# Patient Record
Sex: FEMALE | Race: WHITE | NOT HISPANIC OR LATINO | Employment: FULL TIME | URBAN - METROPOLITAN AREA
[De-identification: names, ages, dates, MRNs, and addresses within clinical notes are randomized per-mention and may not be internally consistent; named-entity substitution may affect disease eponyms.]

---

## 2018-10-29 PROBLEM — Z12.4 CERVICAL CANCER SCREENING: Status: ACTIVE | Noted: 2018-10-29

## 2018-10-31 ENCOUNTER — RESULT FOLLOW UP (OUTPATIENT)
Dept: FAMILY MEDICINE | Facility: CLINIC | Age: 33
End: 2018-10-31

## 2018-10-31 ENCOUNTER — OFFICE VISIT (OUTPATIENT)
Dept: FAMILY MEDICINE | Facility: CLINIC | Age: 33
End: 2018-10-31
Payer: COMMERCIAL

## 2018-10-31 VITALS
HEIGHT: 67 IN | SYSTOLIC BLOOD PRESSURE: 118 MMHG | WEIGHT: 186.5 LBS | OXYGEN SATURATION: 98 % | BODY MASS INDEX: 29.27 KG/M2 | HEART RATE: 75 BPM | RESPIRATION RATE: 14 BRPM | TEMPERATURE: 98 F | DIASTOLIC BLOOD PRESSURE: 72 MMHG

## 2018-10-31 DIAGNOSIS — E03.9 ACQUIRED HYPOTHYROIDISM: ICD-10-CM

## 2018-10-31 DIAGNOSIS — Z13.1 SCREENING FOR DIABETES MELLITUS: ICD-10-CM

## 2018-10-31 DIAGNOSIS — E78.2 MIXED HYPERLIPIDEMIA: ICD-10-CM

## 2018-10-31 DIAGNOSIS — Z12.4 CERVICAL CANCER SCREENING: ICD-10-CM

## 2018-10-31 DIAGNOSIS — Z00.00 ENCOUNTER FOR ROUTINE ADULT HEALTH EXAMINATION WITHOUT ABNORMAL FINDINGS: Primary | ICD-10-CM

## 2018-10-31 DIAGNOSIS — Z13.29 SCREENING FOR THYROID DISORDER: ICD-10-CM

## 2018-10-31 DIAGNOSIS — R87.810 CERVICAL HIGH RISK HPV (HUMAN PAPILLOMAVIRUS) TEST POSITIVE: ICD-10-CM

## 2018-10-31 LAB — HBA1C MFR BLD: 5.6 % (ref 0–5.6)

## 2018-10-31 PROCEDURE — 87624 HPV HI-RISK TYP POOLED RSLT: CPT | Performed by: FAMILY MEDICINE

## 2018-10-31 PROCEDURE — G0145 SCR C/V CYTO,THINLAYER,RESCR: HCPCS | Performed by: FAMILY MEDICINE

## 2018-10-31 PROCEDURE — 99385 PREV VISIT NEW AGE 18-39: CPT | Performed by: FAMILY MEDICINE

## 2018-10-31 PROCEDURE — 36415 COLL VENOUS BLD VENIPUNCTURE: CPT | Performed by: FAMILY MEDICINE

## 2018-10-31 PROCEDURE — 80061 LIPID PANEL: CPT | Performed by: FAMILY MEDICINE

## 2018-10-31 PROCEDURE — 83036 HEMOGLOBIN GLYCOSYLATED A1C: CPT | Performed by: FAMILY MEDICINE

## 2018-10-31 PROCEDURE — 84443 ASSAY THYROID STIM HORMONE: CPT | Performed by: FAMILY MEDICINE

## 2018-10-31 PROCEDURE — 84439 ASSAY OF FREE THYROXINE: CPT | Performed by: FAMILY MEDICINE

## 2018-10-31 RX ORDER — ATORVASTATIN CALCIUM 20 MG/1
20 TABLET, FILM COATED ORAL DAILY
Refills: 3 | COMMUNITY
Start: 2018-10-15 | End: 2020-03-05

## 2018-10-31 RX ORDER — LEVOTHYROXINE SODIUM 50 UG/1
50 TABLET ORAL DAILY
Refills: 1 | COMMUNITY
Start: 2018-10-15 | End: 2019-02-22

## 2018-10-31 RX ORDER — NORETHINDRONE ACETATE AND ETHINYL ESTRADIOL .02; 1 MG/1; MG/1
1 TABLET ORAL DAILY
COMMUNITY
End: 2019-10-14

## 2018-10-31 NOTE — PROGRESS NOTES
SUBJECTIVE:   CC: Princess Bonner is an 33 year old woman who presents for preventive health visit.     Physical   Annual:     Getting at least 3 servings of Calcium per day:  Yes    Bi-annual eye exam:  NO    Dental care twice a year:  Yes    Sleep apnea or symptoms of sleep apnea:  None    Diet:  Regular (no restrictions)    Frequency of exercise:  4-5 days/week    Duration of exercise:  45-60 minutes    Taking medications regularly:  Yes    Medication side effects:  None    Additional concerns today:  No      Originally from [place], [place].    Moved to [place] in 2012.  Moved back to [place] recently and needs to establish care and get meds refilled/managed here, along with labs that are due.     Went to her PCP in [place] last May 2018.    Would like to get help with weight loss.     Today's PHQ-2 Score:   PHQ-2 ( 1999 [employer]) 10/31/2018   Q1: Little interest or pleasure in doing things 0   Q2: Feeling down, depressed or hopeless 0   PHQ-2 Score 0   Q1: Little interest or pleasure in doing things Not at all   Q2: Feeling down, depressed or hopeless Not at all   PHQ-2 Score 0       Abuse: Current or Past(Physical, Sexual or Emotional)- No  Do you feel safe in your environment - Yes    Social History   Substance Use Topics     Smoking status: Never Smoker     Smokeless tobacco: Never Used     Alcohol use Yes      Comment: Occ     Alcohol Use 10/31/2018   If you drink alcohol do you typically have greater than 3 drinks per day OR greater than 7 drinks per week? No       Reviewed orders with patient.  Reviewed health maintenance and updated orders accordingly - Yes  Labs reviewed in EPIC        Pertinent mammograms are reviewed under the imaging tab.  History of abnormal Pap smear: NO - age 30-65 PAP every 5 years with negative HPV co-testing recommended     Reviewed and updated as needed this visit by clinical staff  Tobacco  Allergies  Meds  Problems  Med Hx  Surg Hx  Fam Hx  Soc Hx          Reviewed and updated as  "needed this visit by Provider  Allergies  Meds  Problems          History reviewed. No pertinent past medical history.   Past Surgical History:   Procedure Laterality Date     wisdom teeth removed      first surgery was 2010, had bottom wosdom teeth taken out in August 2018     Obstetric History     No data available          Review of Systems  CONSTITUTIONAL: NEGATIVE for fever, chills, change in weight  INTEGUMENTARU/SKIN: NEGATIVE for worrisome rashes, moles or lesions  EYES: NEGATIVE for vision changes or irritation  ENT: NEGATIVE for ear, mouth and throat problems  RESP: NEGATIVE for significant cough or SOB  BREAST: NEGATIVE for masses, tenderness or discharge  CV: NEGATIVE for chest pain, palpitations or peripheral edema  GI: NEGATIVE for nausea, abdominal pain, heartburn, or change in bowel habits  : NEGATIVE for unusual urinary or vaginal symptoms. Periods are regular.  MUSCULOSKELETAL: NEGATIVE for significant arthralgias or myalgia  NEURO: NEGATIVE for weakness, dizziness or paresthesias  HEME/ALLERGY/IMMUNE: NEGATIVE for bleeding problems  PSYCHIATRIC: NEGATIVE for changes in mood or affect     OBJECTIVE:   /72 (Cuff Size: Adult Regular)  Pulse 75  Temp 98  F (36.7  C) (Tympanic)  Resp 14  Ht 5' 7\" (1.702 m)  Wt 186 lb 8 oz (84.6 kg)  LMP 10/23/2018  SpO2 98%  Breastfeeding? No  BMI 29.21 kg/m2  Physical Exam  GENERAL: healthy, alert and no distress  EYES: Eyes grossly normal to inspection, PERRL and conjunctivae and sclerae normal  HENT: ear canals and TM's normal, nose and mouth without ulcers or lesions  NECK: no adenopathy, no asymmetry, masses, or scars and thyroid normal to palpation  RESP: lungs clear to auscultation - no rales, rhonchi or wheezes  BREAST: normal without masses, tenderness or nipple discharge and no palpable axillary masses or adenopathy  CV: regular rate and rhythm, normal S1 S2, no S3 or S4, no murmur, click or rub, no peripheral edema and peripheral pulses " strong  ABDOMEN: soft, nontender, no hepatosplenomegaly, no masses and bowel sounds normal   (female): normal female external genitalia, normal urethral meatus, vaginal mucosa pink, moist, well rugated, and normal cervix/adnexa/uterus without masses or discharge  MS: no gross musculoskeletal defects noted, no edema  SKIN: no suspicious lesions or rashes  NEURO: Normal strength and tone, mentation intact and speech normal  PSYCH: mentation appears normal, affect normal/bright    Diagnostic Test Results:  TSH/T4, lipids, HgbA1c  ASSESSMENT/PLAN:   Princess was seen today for physical.    Diagnoses and all orders for this visit:    Encounter for routine adult health examination without abnormal findings    Screening for thyroid disorder  -     Cancel: TSH with free T4 reflex    Cervical cancer screening  -     Pap imaged thin layer screen with HPV - recommended age 30 - 65 years (select HPV order below)  -     HPV High Risk Types DNA Cervical    Acquired hypothyroidism  -     TSH  -     T4, free  -     BARIATRIC ADULT REFERRAL    Mixed hyperlipidemia  -     Lipid Profile (Chol, Trig, HDL, LDL calc)  -     BARIATRIC ADULT REFERRAL    Screening for diabetes mellitus  -     Hemoglobin A1c; Future  -     Hemoglobin A1c    Other orders  -     Cancel: TDAP, IM (10 - 64 YRS) - Adacel      Had tetanus shot in 2013.   Had a normal pap smear 2 years ago.  Will consider getting HgbA1c but will check with insurance first.  Agreeable to try out meal program referral to help with weight loss issues.   Follow-up recommended for yearly preventive exams or sooner for an acute issues.      COUNSELING:  Reviewed preventive health counseling, as reflected in patient instructions  Special attention given to:        Regular exercise       Healthy diet/nutrition       Vision screening       Hearing screening       HIV screeninx in teen years, 1x in adult years, and at intervals if high risk    BP Readings from Last 1 Encounters:  "  10/31/18 118/72     Estimated body mass index is 29.21 kg/(m^2) as calculated from the following:    Height as of this encounter: 5' 7\" (1.702 m).    Weight as of this encounter: 186 lb 8 oz (84.6 kg).      Weight management plan: Discussed healthy diet and exercise guidelines and patient will follow up in 12 months in clinic to re-evaluate.     reports that she has never smoked. She has never used smokeless tobacco.      Counseling Resources:  ATP IV Guidelines  Pooled Cohorts Equation Calculator  Breast Cancer Risk Calculator  FRAX Risk Assessment  ICSI Preventive Guidelines  Dietary Guidelines for Americans, 2010  USDA's MyPlate  ASA Prophylaxis  Lung CA Screening    Eladia Yi,   [facility]  Answers for HPI/ROS submitted by the patient on 10/31/2018   PHQ-2 Score: 0    "

## 2018-10-31 NOTE — MR AVS SNAPSHOT
After Visit Summary   10/31/2018    Princess Bonner    MRN: [id number]           Patient Information     Date Of Birth          1985        Visit Information        Provider Department      10/31/2018 9:50 AM Eladia Yi DO [facility]        Today's Diagnoses     Encounter for routine adult health examination without abnormal findings    -  1    Screening for thyroid disorder        Cervical cancer screening        Acquired hypothyroidism        Mixed hyperlipidemia          Care Instructions      Preventive Health Recommendations  Female Ages 26 - 39  Yearly exam:   See your health care provider every year in order to    Review health changes.     Discuss preventive care.      Review your medicines if you your doctor has prescribed any.    Until age 30: Get a Pap test every three years (more often if you have had an abnormal result).    After age 30: Talk to your doctor about whether you should have a Pap test every 3 years or have a Pap test with HPV screening every 5 years.   You do not need a Pap test if your uterus was removed (hysterectomy) and you have not had cancer.  You should be tested each year for STDs (sexually transmitted diseases), if you're at risk.   Talk to your provider about how often to have your cholesterol checked.  If you are at risk for diabetes, you should have a diabetes test (fasting glucose).  Shots: Get a flu shot each year. Get a tetanus shot every 10 years.   Nutrition:     Eat at least 5 servings of fruits and vegetables each day.    Eat whole-grain bread, whole-wheat pasta and brown rice instead of white grains and rice.    Get adequate Calcium and Vitamin D.     Lifestyle    Exercise at least 150 minutes a week (30 minutes a day, 5 days of the week). This will help you control your weight and prevent disease.    Limit alcohol to one drink per day.    No smoking.     Wear sunscreen to prevent skin cancer.    See your dentist  "every six months for an exam and cleaning.            Follow-ups after your visit        Follow-up notes from your care team     Return in about 1 year (around 10/31/2019) for Physical Exam.      Who to contact     If you have questions or need follow up information about today's clinic visit or your schedule please contact [facility] directly at [phone].  Normal or non-critical lab and imaging results will be communicated to you by neoSaejhart, letter or phone within 4 business days after the clinic has received the results. If you do not hear from us within 7 days, please contact the clinic through neoSaejhart or phone. If you have a critical or abnormal lab result, we will notify you by phone as soon as possible.  Submit refill requests through [facility] or call your pharmacy and they will forward the refill request to us. Please allow 3 business days for your refill to be completed.          Additional Information About Your Visit        neoSaejharvideoNEXT Information     [facility] lets you send messages to your doctor, view your test results, renew your prescriptions, schedule appointments and more. To sign up, go to [email or web address] . Click on \"Log in\" on the left side of the screen, which will take you to the Welcome page. Then click on \"Sign up Now\" on the right side of the page.     You will be asked to enter the access code listed below, as well as some personal information. Please follow the directions to create your username and password.     Your access code is: [email or web address]  Expires: 2019  9:57 AM     Your access code will  in 90 days. If you need help or a new code, please call your [facility] or [phone].        Care EveryWhere ID     This is your Care EveryWhere ID. This could be used by other organizations to access your [facility] medical records  [id number]        Your Vitals Were     Pulse Temperature Respirations Height Last Period Pulse Oximetry    75 98 " " F (36.7  C) (Tympanic) 14 5' 7\" (1.702 m) 10/23/2018 98%    Breastfeeding? BMI (Body Mass Index)                No 29.21 kg/m2           Blood Pressure from Last 3 Encounters:   10/31/18 118/72    Weight from Last 3 Encounters:   10/31/18 186 lb 8 oz (84.6 kg)              We Performed the Following     HPV High Risk Types DNA Cervical     Lipid Profile (Chol, Trig, HDL, LDL calc)     Pap imaged thin layer screen with HPV - recommended age 30 - 65 years (select HPV order below)     TSH with free T4 reflex        Primary Care Provider Office Phone # Fax #    [facility] [phone] [address] [facility] [address]        Equal Access to Services     SHIVAM BERMEO : Hadii harleen reeder hadasho Soomaali, waaxda luqadaha, qaybta kaalmada adeegyada, santiago thomas . So [facility] [phone].    ATENCIÓN: Si habla español, tiene a perry disposición servicios gratuitos de asistencia lingüística. Llame al [phone].    We comply with applicable federal civil rights laws and [place] laws. We do not discriminate on the basis of race, color, national origin, age, disability, sex, sexual orientation, or gender identity.            Thank you!     Thank you for choosing [facility]  for your care. Our goal is always to provide you with excellent care. Hearing back from our patients is one way we can continue to improve our services. Please take a few minutes to complete the written survey that you may receive in the mail after your visit with us. Thank you!             Your Updated Medication List - Protect others around you: Learn how to safely use, store and throw away your medicines at www.disposemymeds.org.          This list is accurate as of 10/31/18 10:40 AM.  Always use your most recent med list.                   Brand Name Dispense Instructions for use Diagnosis    atorvastatin 20 MG tablet    LIPITOR     Take 20 mg " by mouth daily        levothyroxine 50 MCG tablet    SYNTHROID/LEVOTHROID     Take 50 mcg by mouth daily        norethindrone-ethinyl estradiol 1-20 MG-MCG per tablet    MICROGESTIN 1/20     Take 1 tablet by mouth daily

## 2018-11-01 LAB
CHOLEST SERPL-MCNC: 199 MG/DL
HDLC SERPL-MCNC: 47 MG/DL
LDLC SERPL CALC-MCNC: 91 MG/DL
NONHDLC SERPL-MCNC: 152 MG/DL
T4 FREE SERPL-MCNC: 0.96 NG/DL (ref 0.76–1.46)
TRIGL SERPL-MCNC: 303 MG/DL
TSH SERPL DL<=0.005 MIU/L-ACNC: 1.49 MU/L (ref 0.4–4)

## 2018-11-02 LAB
COPATH REPORT: NORMAL
PAP: NORMAL

## 2018-11-05 LAB
FINAL DIAGNOSIS: ABNORMAL
HPV HR 12 DNA CVX QL NAA+PROBE: NEGATIVE
HPV16 DNA SPEC QL NAA+PROBE: POSITIVE
HPV18 DNA SPEC QL NAA+PROBE: NEGATIVE
SPECIMEN DESCRIPTION: ABNORMAL
SPECIMEN SOURCE CVX/VAG CYTO: ABNORMAL

## 2018-11-05 NOTE — PROGRESS NOTES
10/31/18: NIL pap, + HR HPV type 16. Plan [facility] due bef 01/31/19.  11/06/18:Msg left to call back. Pt was advised.  11/13/18 [facility] Bx--BELA 1. Plan: pap in 1 year. Pt was advised. ([facility])  10/24/19 NIL pap, Neg HPV. Plan: cotest in 3 years ([facility])

## 2018-11-13 ENCOUNTER — OFFICE VISIT (OUTPATIENT)
Dept: OBGYN | Facility: CLINIC | Age: 33
End: 2018-11-13
Payer: COMMERCIAL

## 2018-11-13 DIAGNOSIS — Z01.812 PRE-PROCEDURE LAB EXAM: ICD-10-CM

## 2018-11-13 DIAGNOSIS — R87.810 CERVICAL HIGH RISK HPV (HUMAN PAPILLOMAVIRUS) TEST POSITIVE: Primary | ICD-10-CM

## 2018-11-13 DIAGNOSIS — N89.8 ITCHING OF VAGINA: ICD-10-CM

## 2018-11-13 LAB — BETA HCG QUAL IFA URINE: NEGATIVE

## 2018-11-13 PROCEDURE — 88305 TISSUE EXAM BY PATHOLOGIST: CPT | Performed by: OBSTETRICS & GYNECOLOGY

## 2018-11-13 PROCEDURE — 84703 CHORIONIC GONADOTROPIN ASSAY: CPT | Performed by: OBSTETRICS & GYNECOLOGY

## 2018-11-13 PROCEDURE — 57454 BX/CURETT OF CERVIX W/SCOPE: CPT | Performed by: OBSTETRICS & GYNECOLOGY

## 2018-11-13 RX ORDER — FLUCONAZOLE 150 MG/1
150 TABLET ORAL
Qty: 2 TABLET | Refills: 0 | Status: SHIPPED | OUTPATIENT
Start: 2018-11-13 | End: 2018-11-17

## 2018-11-13 NOTE — MR AVS SNAPSHOT
After Visit Summary   11/13/2018    Princess Bonner    MRN: [id number]           Patient Information     Date Of Birth          1985        Visit Information        Provider Department      11/13/2018 9:00 AM Lucía eGorge MD; [facility]POSCOPE 1 [facility]        Today's Diagnoses     Cervical high risk HPV (human papillomavirus) test positive    -  1    Pre-procedure lab exam        Itching of vagina           Follow-ups after your visit        Who to contact     If you have questions or need follow up information about today's clinic visit or your schedule please contact [facility] directly at [phone].  Normal or non-critical lab and imaging results will be communicated to you by MyChart, letter or phone within 4 business days after the clinic has received the results. If you do not hear from us within 7 days, please contact the clinic through Yesmywinehart or phone. If you have a critical or abnormal lab result, we will notify you by phone as soon as possible.  Submit refill requests through [employer] or call your pharmacy and they will forward the refill request to us. Please allow 3 business days for your refill to be completed.          Additional Information About Your Visit        MyChart Information     [employer] gives you secure access to your electronic health record. If you see a primary care provider, you can also send messages to your care team and make appointments. If you have questions, please call your primary care clinic.  If you do not have a primary care provider, please call [phone] and they will assist you.        Care EveryWhere ID     This is your Care EveryWhere ID. This could be used by other organizations to access your [place] medical records  [id number]        Your Vitals Were     Last Period                   10/23/2018            Blood Pressure from Last 3 Encounters:   10/31/18 118/72    Weight from Last 3  Encounters:   10/31/18 186 lb 8 oz (84.6 kg)              We Performed the Following     Beta HCG Qual, Urine - [facility] and [place] ([id number])     COLP CERVIX/UPPER VAGINA W ENDOCERV CURETT     Surgical pathology exam          Today's Medication Changes          These changes are accurate as of 11/13/18  9:41 AM.  If you have any questions, ask your nurse or doctor.               Start taking these medicines.        Dose/Directions    fluconazole 150 MG tablet   Commonly known as:  DIFLUCAN   Used for:  Itching of vagina   Started by:  Lucía George MD        Dose:  150 mg   Take 1 tablet (150 mg) by mouth every 72 hours for 2 doses   Quantity:  2 tablet   Refills:  0            Where to get your medicines      These medications were sent to [facility]/pharmacy #[id number] - [place], [place] - [address]  [address] [address]     Phone:  [phone]     fluconazole 150 MG tablet                Primary Care Provider Office Phone # Fax #    [facility] [phone] [address] [facility] [address]        Equal Access to Services     SHIVAM BERMEO : Hadii harleen ku hadasho Soomaali, waaxda luqadaha, qaybta kaalmada adejluisyada, santiago thomas . So [facility] [phone].    ATENCIÓN: Si habla español, tiene a perry disposición servicios gratuitos de asistencia lingüística. Llame al [phone].    We comply with applicable federal civil rights laws and [place] laws. We do not discriminate on the basis of race, color, national origin, age, disability, sex, sexual orientation, or gender identity.            Thank you!     Thank you for choosing [facility]  for your care. Our goal is always to provide you with excellent care. Hearing back from our patients is one way we can continue to improve our services. Please take a few minutes to complete the written survey that you may receive in the mail after your visit  with us. Thank you!             Your Updated Medication List - Protect others around you: Learn how to safely use, store and throw away your medicines at www.disposemymeds.org.          This list is accurate as of 11/13/18  9:41 AM.  Always use your most recent med list.                   Brand Name Dispense Instructions for use Diagnosis    atorvastatin 20 MG tablet    LIPITOR     Take 20 mg by mouth daily        fluconazole 150 MG tablet    DIFLUCAN    2 tablet    Take 1 tablet (150 mg) by mouth every 72 hours for 2 doses    Itching of vagina       levothyroxine 50 MCG tablet    SYNTHROID/LEVOTHROID     Take 50 mcg by mouth daily        MULTIVITAMIN PO           norethindrone-ethinyl estradiol 1-20 MG-MCG per tablet    MICROGESTIN 1/20     Take 1 tablet by mouth daily

## 2018-11-13 NOTE — PROGRESS NOTES
INDICATIONS:                                                        Princess Bonner, is a 33 year old female, who had a recent NIL pap, positive HPV-16.  Patient has prior history of abnormal pap with cryo therapy in 2008, and normal paps since then.     Here today for colposcopy. Discussed indication, risks of infection and bleeding.    Her last pap was   Lab Results   Component Value Date    PAP NIL, +HPV-16 10/31/2018     Is a pregnancy test required: Yes.  Was it positive or negative?  Negative  Was a consent obtained?  Yes      PROCEDURE:                                                      Cervix is stained with acetic acid and viewed colposcopically. Squamocolumnar junction is not visualized in it's entirety. However a frond like acetowhite lesion is seen at the 12 o'clock position.. acetowhite lesion(s) noted at 12 o'clock . Biopsy done Yes. Endocervical curretage Done         POST PROCEDURE:                                                      IMPRESSION: Patient tolerated procedure well, colposcopy adequate, HPV and BELA 1    PLAN : Await the results of the biopsies.  Repeat pap in 12 months.  She  tolerated the procedure well. There were no complications. Patient was discharged in stable condition.    Patient advised to call the clinic if excessive bleeding, pelvic pain, or fever.     Follow-up plan based on pathology results.    Lucía George MD

## 2018-11-15 LAB — COPATH REPORT: NORMAL

## 2018-11-16 ENCOUNTER — MYC MEDICAL ADVICE (OUTPATIENT)
Dept: FAMILY MEDICINE | Facility: CLINIC | Age: 33
End: 2018-11-16

## 2019-01-17 DIAGNOSIS — Z52.001 STEM CELL DONOR: Primary | ICD-10-CM

## 2019-01-22 DIAGNOSIS — Z52.001 STEM CELL DONOR: Primary | ICD-10-CM

## 2019-01-29 ENCOUNTER — OFFICE VISIT (OUTPATIENT)
Dept: TRANSPLANT | Facility: CLINIC | Age: 34
End: 2019-01-29
Attending: PHYSICIAN ASSISTANT
Payer: COMMERCIAL

## 2019-01-29 ENCOUNTER — APPOINTMENT (OUTPATIENT)
Dept: LAB | Facility: CLINIC | Age: 34
End: 2019-01-29
Attending: INTERNAL MEDICINE
Payer: COMMERCIAL

## 2019-01-29 VITALS
RESPIRATION RATE: 16 BRPM | TEMPERATURE: 97.9 F | SYSTOLIC BLOOD PRESSURE: 126 MMHG | OXYGEN SATURATION: 100 % | DIASTOLIC BLOOD PRESSURE: 79 MMHG | HEART RATE: 87 BPM

## 2019-01-29 DIAGNOSIS — Z52.001 STEM CELL DONOR: ICD-10-CM

## 2019-01-29 LAB
APTT PPP: 27 SEC (ref 22–37)
BASOPHILS # BLD AUTO: 0 10E9/L (ref 0–0.2)
BASOPHILS NFR BLD AUTO: 0.4 %
DIFFERENTIAL METHOD BLD: NORMAL
EOSINOPHIL # BLD AUTO: 0.3 10E9/L (ref 0–0.7)
EOSINOPHIL NFR BLD AUTO: 3.4 %
ERYTHROCYTE [DISTWIDTH] IN BLOOD BY AUTOMATED COUNT: 12.7 % (ref 10–15)
HCG UR QL: NEGATIVE
HCT VFR BLD AUTO: 43.1 % (ref 35–47)
HGB BLD-MCNC: 14.5 G/DL (ref 11.7–15.7)
IMM GRANULOCYTES # BLD: 0 10E9/L (ref 0–0.4)
IMM GRANULOCYTES NFR BLD: 0.3 %
INR PPP: 0.91 (ref 0.86–1.14)
LYMPHOCYTES # BLD AUTO: 1.7 10E9/L (ref 0.8–5.3)
LYMPHOCYTES NFR BLD AUTO: 18.2 %
MCH RBC QN AUTO: 30.7 PG (ref 26.5–33)
MCHC RBC AUTO-ENTMCNC: 33.6 G/DL (ref 31.5–36.5)
MCV RBC AUTO: 91 FL (ref 78–100)
MONOCYTES # BLD AUTO: 0.7 10E9/L (ref 0–1.3)
MONOCYTES NFR BLD AUTO: 7.4 %
NEUTROPHILS # BLD AUTO: 6.5 10E9/L (ref 1.6–8.3)
NEUTROPHILS NFR BLD AUTO: 70.3 %
NRBC # BLD AUTO: 0 10*3/UL
NRBC BLD AUTO-RTO: 0 /100
PLATELET # BLD AUTO: 223 10E9/L (ref 150–450)
RBC # BLD AUTO: 4.73 10E12/L (ref 3.8–5.2)
WBC # BLD AUTO: 9.3 10E9/L (ref 4–11)

## 2019-01-29 PROCEDURE — G0463 HOSPITAL OUTPT CLINIC VISIT: HCPCS | Mod: ZF

## 2019-01-29 PROCEDURE — 85610 PROTHROMBIN TIME: CPT | Performed by: INTERNAL MEDICINE

## 2019-01-29 PROCEDURE — 36415 COLL VENOUS BLD VENIPUNCTURE: CPT

## 2019-01-29 PROCEDURE — 85025 COMPLETE CBC W/AUTO DIFF WBC: CPT | Performed by: INTERNAL MEDICINE

## 2019-01-29 PROCEDURE — 85730 THROMBOPLASTIN TIME PARTIAL: CPT | Performed by: INTERNAL MEDICINE

## 2019-01-29 PROCEDURE — 81025 URINE PREGNANCY TEST: CPT | Performed by: PHYSICIAN ASSISTANT

## 2019-01-29 ASSESSMENT — PAIN SCALES - GENERAL: PAINLEVEL: NO PAIN (0)

## 2019-01-29 NOTE — NURSING NOTE
"Oncology Rooming Note    January 29, 2019 9:22 AM   Princess Bonner is a 33 year old female who presents for:    Chief Complaint   Patient presents with     RECHECK     RTN- URD H&P     Initial Vitals: /79 (BP Location: Right arm, Patient Position: Sitting, Cuff Size: Adult Regular)   Pulse 87   Temp 97.9  F (36.6  C) (Oral)   Resp 16   SpO2 100%  Estimated body mass index is 29.21 kg/m  as calculated from the following:    Height as of 10/31/18: 1.702 m (5' 7\").    Weight as of 10/31/18: 84.6 kg (186 lb 8 oz). There is no height or weight on file to calculate BSA.  No Pain (0) Comment: Data Unavailable   No LMP recorded.  Allergies reviewed: Yes  Medications reviewed: Yes    Medications: Medication refills not needed today.  Pharmacy name entered into [employer]    [facility] [facility] [address] AT [facility] & [address] [place] [address]/PHARMACY #[id number] - [facility] [address] [facility]    Clinical concerns: none     7 minutes for nursing intake (face to face time)     Anais Barajas CMA              "

## 2019-01-29 NOTE — NURSING NOTE
Chief Complaint   Patient presents with     RECHECK     RTN- URD H&P     Blood Draw     Labs drawn via VPT by RN in lab. VS taken. Pt checked in for next appt     Labs collected from venipuncture by RN. Vitals taken. Checked in for appointment(s). No urine order. Pt stated for HCG. Test re-ordered, notified provider.    Keira DUARTE RN PHN BSN  BMT/Oncology Lab

## 2019-01-29 NOTE — PROGRESS NOTES
BMT Donor History and Physical    Princess Bonner MRN# [id number]   Age: 33 year old YOB: 1985            Assessment and Plan   Princess is a 33 year old women who is donating Peripheral blood stem cell through the [employer] Program: [employer].  She is currently healthy.  Recently had her thyroid labs checked again and her current levothyroxine dose is correct. She says no fevers.  No recent antibiotics. She has a infrequent cough after having a cold about 2 weeks ago.  No fevers. She says she is having this second history and physical because she will need a central line for PBSCT collection.  Her CBC, coags and pregnancy test today are within normal and her pregnancy test is egative.  She is no longer taking oral birth control.     HPI:  Worked for [employer] and was part of be the match.  She lives in [place], [place]. drive they had.  Transfusions: No  Hepatitis: No  Currently pregnant or any chance may be pregnant: No  Currently breast feeding: No  Currently using a method of birth control: No  Number of previous pregnancies: no  Alcohol use: Not currently drinking.  Occasional glass of wine  Tobacco use: No  Recreational drugs: No  Nonmedical percutaneous drug use: No  Recent immunizations or planning on getting any immunizations: Yes, flu shot in October  Ear or body piercing in the last 12 months: No  New tattoo in recent 12 months:No  History of cancer or blood disease: No  Known risk factors: No travel outside of the [place]         Past Medical History:   Hypothyroid, diagnosed in April 2018  Hyperlipidemia, familial, takes atorvastatin       Past Surgical History:    [place] teeth removed.         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  Lives in [place].         Family History:   Aunt with pancreatic cancer.  Cousin who had liver cancer.  Mom and dad are well and alive.  Dad's mom and dad both had strokes.  Mother's mom had a stroke as  well.Uncle has diabetes.  One sister and one brother.  Brother has depression.  Sister is healthy       Immunizations:   Recent flu shot.         Allergies:   No known drug allergies         Medications:     Current Outpatient Medications   Medication Sig     atorvastatin (LIPITOR) 20 MG tablet Take 20 mg by mouth daily     levothyroxine (SYNTHROID/LEVOTHROID) 50 MCG tablet Take 50 mcg by mouth daily     Multiple Vitamins-Minerals (MULTIVITAMIN PO)      norethindrone-ethinyl estradiol (MICROGESTIN 1/20) 1-20 MG-MCG per tablet Take 1 tablet by mouth daily     No current facility-administered medications for this visit.             Review of Systems:   CONSTITUTIONAL:  negative for  fevers, chills, sweats, fatigue and weight loss  EYES:  Negative.  No blank spots or floaters  HEENT:  Negative. No current sneezing or congestion.  No problems swallowing..  RESPIRATORY:  Occasional, nonproductive cough, no sob  CARDIOVASCULAR:  Negative, no chest pain or palpitations  negative for  chest pain, dyspnea, palpitations  GASTROINTESTINAL:  negative for nausea, vomiting, diarrhea, abdominal pain, hematemesis and hemtochezia.  No black or tarry stool.  MUSCULOSKELETAL:  negative for  myalgias, arthralgias, joint swelling and muscle weakness  MOOD: no history of depression  : monthly menstrual cycle  Skin: no current rash       Physical Exam:   Blood pressure 126/79, pulse 87, temperature 97.9  F (36.6  C), temperature source Oral, resp. rate 16, SpO2 100 %, not currently breastfeeding.      Constitutional:   awake, alert, cooperative, no apparent distress, and appears stated age     Eyes:   Lids and lashes normal, pupils equal, round and reactive to light, extra ocular muscles intact, sclera clear, conjunctiva normal     ENT:   Normocephalic, without obvious abnormality, atraumatic, sinuses nontender on palpation, external ears without lesions, oral pharynx with moist mucous membranes, tonsils without erythema or exudates,  gums normal and good dentition.     Lungs:   No increased work of breathing, good air exchange, clear to auscultation bilaterally, no crackles or wheezing     Cardiovascular:   Normal apical impulse, regular rate and rhythm, normal S1 and S2, no S3 or S4, and no murmur noted     Abdomen:   No scars, normal bowel sounds, soft, non-distended, non-tender, no masses palpated, no hepatosplenomegally     Skin:   no bruising or bleeding, no rashes and no lesions            Data:   All laboratory data reviewed         María Reese PA-C

## 2019-02-15 ENCOUNTER — MYC MEDICAL ADVICE (OUTPATIENT)
Dept: FAMILY MEDICINE | Facility: CLINIC | Age: 34
End: 2019-02-15

## 2019-02-15 DIAGNOSIS — E03.9 ACQUIRED HYPOTHYROIDISM: Primary | ICD-10-CM

## 2019-02-22 RX ORDER — LEVOTHYROXINE SODIUM 50 UG/1
50 TABLET ORAL DAILY
Qty: 90 TABLET | Refills: 1 | Status: SHIPPED | OUTPATIENT
Start: 2019-02-22 | End: 2019-09-12

## 2019-02-22 NOTE — TELEPHONE ENCOUNTER
"Last OV 10/31/2018.  Requested Prescriptions   Pending Prescriptions Disp Refills     levothyroxine (SYNTHROID/LEVOTHROID) 50 MCG tablet  1     Sig: Take 1 tablet (50 mcg) by mouth daily    Thyroid Protocol Passed - 2/22/2019  8:53 AM       Passed - Patient is 12 years or older       Passed - Recent (12 mo) or future (30 days) visit within the authorizing provider's specialty    Patient had office visit in the last 12 months or has a visit in the next 30 days with authorizing provider or within the authorizing provider's specialty.  See \"Patient Info\" tab in inbasket, or \"Choose Columns\" in Meds & Orders section of the refill encounter.             Passed - Medication is active on med list       Passed - Normal TSH on file in past 12 months    Recent Labs   Lab Test 10/31/18  1045   TSH 1.49             Passed - No active pregnancy on record    If patient is pregnant or has had a positive pregnancy test, please check TSH.         Passed - No positive pregnancy test in past 12 months    If patient is pregnant or has had a positive pregnancy test, please check TSH.          Routing refill request to provider for review/approval because:  Medication is reported/historical        "

## 2019-10-14 ENCOUNTER — OFFICE VISIT (OUTPATIENT)
Dept: FAMILY MEDICINE | Facility: CLINIC | Age: 34
End: 2019-10-14
Payer: COMMERCIAL

## 2019-10-14 VITALS
OXYGEN SATURATION: 96 % | SYSTOLIC BLOOD PRESSURE: 102 MMHG | WEIGHT: 187 LBS | DIASTOLIC BLOOD PRESSURE: 64 MMHG | HEIGHT: 67 IN | BODY MASS INDEX: 29.35 KG/M2 | TEMPERATURE: 96.9 F | HEART RATE: 78 BPM | RESPIRATION RATE: 16 BRPM

## 2019-10-14 DIAGNOSIS — R53.83 OTHER FATIGUE: ICD-10-CM

## 2019-10-14 DIAGNOSIS — R87.810 CERVICAL HIGH RISK HPV (HUMAN PAPILLOMAVIRUS) TEST POSITIVE: ICD-10-CM

## 2019-10-14 DIAGNOSIS — Z00.00 ENCOUNTER FOR ROUTINE ADULT HEALTH EXAMINATION WITHOUT ABNORMAL FINDINGS: Primary | ICD-10-CM

## 2019-10-14 DIAGNOSIS — E78.2 MIXED HYPERLIPIDEMIA: ICD-10-CM

## 2019-10-14 DIAGNOSIS — E03.9 ACQUIRED HYPOTHYROIDISM: ICD-10-CM

## 2019-10-14 LAB
ALBUMIN SERPL-MCNC: 4.1 G/DL (ref 3.4–5)
ALP SERPL-CCNC: 54 U/L (ref 40–150)
ALT SERPL W P-5'-P-CCNC: 28 U/L (ref 0–50)
ANION GAP SERPL CALCULATED.3IONS-SCNC: 10 MMOL/L (ref 3–14)
AST SERPL W P-5'-P-CCNC: 19 U/L (ref 0–45)
BASOPHILS # BLD AUTO: 0 10E9/L (ref 0–0.2)
BASOPHILS NFR BLD AUTO: 0.5 %
BILIRUB SERPL-MCNC: 1.3 MG/DL (ref 0.2–1.3)
BUN SERPL-MCNC: 11 MG/DL (ref 7–30)
CALCIUM SERPL-MCNC: 8.7 MG/DL (ref 8.5–10.1)
CHLORIDE SERPL-SCNC: 107 MMOL/L (ref 94–109)
CHOLEST SERPL-MCNC: 186 MG/DL
CO2 SERPL-SCNC: 20 MMOL/L (ref 20–32)
CREAT SERPL-MCNC: 0.88 MG/DL (ref 0.52–1.04)
DIFFERENTIAL METHOD BLD: NORMAL
EOSINOPHIL # BLD AUTO: 0.2 10E9/L (ref 0–0.7)
EOSINOPHIL NFR BLD AUTO: 2.9 %
ERYTHROCYTE [DISTWIDTH] IN BLOOD BY AUTOMATED COUNT: 13 % (ref 10–15)
GFR SERPL CREATININE-BSD FRML MDRD: 85 ML/MIN/{1.73_M2}
GLUCOSE SERPL-MCNC: 105 MG/DL (ref 70–99)
HBA1C MFR BLD: 5.4 % (ref 0–5.6)
HCT VFR BLD AUTO: 41.3 % (ref 35–47)
HDLC SERPL-MCNC: 55 MG/DL
HGB BLD-MCNC: 13.9 G/DL (ref 11.7–15.7)
LDLC SERPL CALC-MCNC: 74 MG/DL
LYMPHOCYTES # BLD AUTO: 2.6 10E9/L (ref 0.8–5.3)
LYMPHOCYTES NFR BLD AUTO: 36.3 %
MCH RBC QN AUTO: 30.3 PG (ref 26.5–33)
MCHC RBC AUTO-ENTMCNC: 33.7 G/DL (ref 31.5–36.5)
MCV RBC AUTO: 90 FL (ref 78–100)
MONOCYTES # BLD AUTO: 0.7 10E9/L (ref 0–1.3)
MONOCYTES NFR BLD AUTO: 8.9 %
NEUTROPHILS # BLD AUTO: 3.7 10E9/L (ref 1.6–8.3)
NEUTROPHILS NFR BLD AUTO: 51.4 %
NONHDLC SERPL-MCNC: 131 MG/DL
PLATELET # BLD AUTO: 238 10E9/L (ref 150–450)
POTASSIUM SERPL-SCNC: 3.4 MMOL/L (ref 3.4–5.3)
PROT SERPL-MCNC: 7.3 G/DL (ref 6.8–8.8)
RBC # BLD AUTO: 4.58 10E12/L (ref 3.8–5.2)
SODIUM SERPL-SCNC: 137 MMOL/L (ref 133–144)
T4 FREE SERPL-MCNC: 0.84 NG/DL (ref 0.76–1.46)
TRIGL SERPL-MCNC: 283 MG/DL
TSH SERPL DL<=0.005 MIU/L-ACNC: 4.66 MU/L (ref 0.4–4)
WBC # BLD AUTO: 7.3 10E9/L (ref 4–11)

## 2019-10-14 PROCEDURE — 99213 OFFICE O/P EST LOW 20 MIN: CPT | Mod: 25 | Performed by: FAMILY MEDICINE

## 2019-10-14 PROCEDURE — 83036 HEMOGLOBIN GLYCOSYLATED A1C: CPT | Performed by: FAMILY MEDICINE

## 2019-10-14 PROCEDURE — 80050 GENERAL HEALTH PANEL: CPT | Performed by: FAMILY MEDICINE

## 2019-10-14 PROCEDURE — 36415 COLL VENOUS BLD VENIPUNCTURE: CPT | Performed by: FAMILY MEDICINE

## 2019-10-14 PROCEDURE — 99395 PREV VISIT EST AGE 18-39: CPT | Performed by: FAMILY MEDICINE

## 2019-10-14 PROCEDURE — 80061 LIPID PANEL: CPT | Performed by: FAMILY MEDICINE

## 2019-10-14 PROCEDURE — 84439 ASSAY OF FREE THYROXINE: CPT | Performed by: FAMILY MEDICINE

## 2019-10-14 ASSESSMENT — MIFFLIN-ST. JEOR: SCORE: 1567.92

## 2019-10-14 NOTE — PROGRESS NOTES
SUBJECTIVE:   CC: Princess Bonner is an 34 year old woman who presents for preventive health visit.     Healthy Habits:     Getting at least 3 servings of Calcium per day:  Yes    Bi-annual eye exam:  NO    Dental care twice a year:  Yes    Sleep apnea or symptoms of sleep apnea:  None    Diet:  Regular (no restrictions)    Frequency of exercise:  4-5 days/week    Duration of exercise:  45-60 minutes    Taking medications regularly:  Yes    Medication side effects:  None    PHQ-2 Total Score: 0    Additional concerns today:  No    Patient has appt at [facility] Dr next week for her PAP  Feeling more tired than usual.  Might be due to thyroid?  Periods have been regular.  No longer taking OCP's.        Today's PHQ-2 Score:   PHQ-2 ( 1999 [employer]) 10/14/2019   Q1: Little interest or pleasure in doing things 0   Q2: Feeling down, depressed or hopeless 0   PHQ-2 Score 0   Q1: Little interest or pleasure in doing things Not at all   Q2: Feeling down, depressed or hopeless Not at all   PHQ-2 Score 0       Abuse: Current or Past(Physical, Sexual or Emotional)- No  Do you feel safe in your environment? Yes    Social History     Tobacco Use     Smoking status: Never Smoker     Smokeless tobacco: Never Used   Substance Use Topics     Alcohol use: Yes     Comment: Occ         Alcohol Use 10/14/2019   Prescreen: >3 drinks/day or >7 drinks/week? No       Reviewed orders with patient.  Reviewed health maintenance and updated orders accordingly - Yes  Lab work is in process  Labs reviewed in [facility]    Mammogram not appropriate for this patient based on age.    Pertinent mammograms are reviewed under the imaging tab.  History of abnormal Pap smear: YES - updated in Problem List and Health Maintenance accordingly  PAP / HPV Latest Ref Rng & Units 10/31/2018   PAP - NIL   HPV 16 DNA NEG:Negative Positive(A)   HPV 18 DNA NEG:Negative Negative   OTHER HR HPV NEG:Negative Negative     Reviewed and updated as needed this visit by clinical  "staff  Tobacco  Allergies  Meds  Med Hx  Surg Hx  Fam Hx  Soc Hx        Reviewed and updated as needed this visit by Provider          Past Medical History:   Diagnosis Date     Cervical high risk HPV (human papillomavirus) test positive 10/31/2018    10/31/18:See problem list.       Past Surgical History:   Procedure Laterality Date     CRYOTHERAPY, CERVICAL  2008     wisdom teeth removed      first surgery was , had bottom wosdom teeth taken out in 2018     OB History    Para Term  AB Living   0 0 0 0 0 0   SAB TAB Ectopic Multiple Live Births   0 0 0 0 0       Review of Systems  CONSTITUTIONAL: NEGATIVE for fever, chills, change in weight  INTEGUMENTARU/SKIN: NEGATIVE for worrisome rashes, moles or lesions  EYES: NEGATIVE for vision changes or irritation  ENT: NEGATIVE for ear, mouth and throat problems  RESP: NEGATIVE for significant cough or SOB  BREAST: NEGATIVE for masses, tenderness or discharge  CV: NEGATIVE for chest pain, palpitations or peripheral edema  GI: NEGATIVE for nausea, abdominal pain, heartburn, or change in bowel habits  : NEGATIVE for unusual urinary or vaginal symptoms. Periods are regular.  MUSCULOSKELETAL: NEGATIVE for significant arthralgias or myalgia  NEURO: NEGATIVE for weakness, dizziness or paresthesias  HEME/ALLERGY/IMMUNE: NEGATIVE for bleeding problems  PSYCHIATRIC: NEGATIVE for changes in mood or affect     OBJECTIVE:   /64   Pulse 78   Temp 96.9  F (36.1  C) (Tympanic)   Resp 16   Ht 1.689 m (5' 6.5\")   Wt 84.8 kg (187 lb)   LMP 2019 (Exact Date)   SpO2 96%   Breastfeeding? No   BMI 29.73 kg/m    Physical Exam  GENERAL: healthy, alert and no distress  EYES: Eyes grossly normal to inspection, PERRL and conjunctivae and sclerae normal  HENT: ear canals and TM's normal, nose and mouth without ulcers or lesions  NECK: no adenopathy, no asymmetry, masses, or scars and thyroid normal to palpation  RESP: lungs clear to auscultation " "- no rales, rhonchi or wheezes  BREAST: declined exam  CV: regular rate and rhythm, normal S1 S2, no S3 or S4, no murmur, click or rub, no peripheral edema and peripheral pulses strong  ABDOMEN: soft, nontender, no hepatosplenomegaly, no masses and bowel sounds normal   (female): declined exam  MS: no gross musculoskeletal defects noted, no edema  SKIN: no suspicious lesions or rashes  NEURO: Normal strength and tone, mentation intact and speech normal  PSYCH: mentation appears normal, affect normal/bright    Diagnostic Test Results:  Labs reviewed in Epic    ASSESSMENT/PLAN:   Princess was seen today for physical.    Diagnoses and all orders for this visit:    Encounter for routine adult health examination without abnormal findings    Acquired hypothyroidism  -     TSH  -     T4 free    Cervical high risk HPV (human papillomavirus) test positive    Mixed hyperlipidemia  -     Comprehensive metabolic panel  -     Hemoglobin A1c  -     Lipid Profile    Other fatigue  -     Comprehensive metabolic panel  -     Hemoglobin A1c  -     CBC with platelets and differential      Fatigue--labs  Recheck thyroid, adjust Levothyroxine dose if needed  Continue statin  Will get pap smear done with GYN    COUNSELING:  Reviewed preventive health counseling, as reflected in patient instructions  Special attention given to:        Regular exercise       Healthy diet/nutrition       Vision screening       Hearing screening       Immunizations    Up to date  Estimated body mass index is 29.73 kg/m  as calculated from the following:    Height as of this encounter: 1.689 m (5' 6.5\").    Weight as of this encounter: 84.8 kg (187 lb).    Weight management plan: Discussed healthy diet and exercise guidelines     reports that she has never smoked. She has never used smokeless tobacco.      Counseling Resources:  ATP IV Guidelines  Pooled Cohorts Equation Calculator  Breast Cancer Risk Calculator  FRAX Risk Assessment  ICSI Preventive " Guidelines  Dietary Guidelines for Americans, 2010  USDA's MyPlate  ASA Prophylaxis  Lung CA Screening    Eladia Yi,   [facility]

## 2019-10-15 ENCOUNTER — MYC MEDICAL ADVICE (OUTPATIENT)
Dept: FAMILY MEDICINE | Facility: CLINIC | Age: 34
End: 2019-10-15

## 2019-10-16 RX ORDER — LEVOTHYROXINE SODIUM 75 UG/1
75 TABLET ORAL DAILY
Qty: 30 TABLET | Refills: 2 | Status: SHIPPED | OUTPATIENT
Start: 2019-10-16 | End: 2019-12-10

## 2019-10-24 ENCOUNTER — OFFICE VISIT (OUTPATIENT)
Dept: OBGYN | Facility: CLINIC | Age: 34
End: 2019-10-24
Payer: COMMERCIAL

## 2019-10-24 VITALS
HEIGHT: 67 IN | BODY MASS INDEX: 29.51 KG/M2 | WEIGHT: 188 LBS | SYSTOLIC BLOOD PRESSURE: 114 MMHG | HEART RATE: 80 BPM | DIASTOLIC BLOOD PRESSURE: 62 MMHG

## 2019-10-24 DIAGNOSIS — N87.0 CERVICAL DYSPLASIA, MILD: ICD-10-CM

## 2019-10-24 DIAGNOSIS — Z12.4 SCREENING FOR CERVICAL CANCER: ICD-10-CM

## 2019-10-24 DIAGNOSIS — Z01.419 ENCOUNTER FOR GYNECOLOGICAL EXAMINATION WITHOUT ABNORMAL FINDING: Primary | ICD-10-CM

## 2019-10-24 PROCEDURE — 88175 CYTOPATH C/V AUTO FLUID REDO: CPT | Performed by: OBSTETRICS & GYNECOLOGY

## 2019-10-24 PROCEDURE — 99395 PREV VISIT EST AGE 18-39: CPT | Performed by: OBSTETRICS & GYNECOLOGY

## 2019-10-24 PROCEDURE — 87624 HPV HI-RISK TYP POOLED RSLT: CPT | Performed by: OBSTETRICS & GYNECOLOGY

## 2019-10-24 ASSESSMENT — ANXIETY QUESTIONNAIRES
2. NOT BEING ABLE TO STOP OR CONTROL WORRYING: NOT AT ALL
5. BEING SO RESTLESS THAT IT IS HARD TO SIT STILL: NOT AT ALL
7. FEELING AFRAID AS IF SOMETHING AWFUL MIGHT HAPPEN: NOT AT ALL
1. FEELING NERVOUS, ANXIOUS, OR ON EDGE: NOT AT ALL
GAD7 TOTAL SCORE: 0
IF YOU CHECKED OFF ANY PROBLEMS ON THIS QUESTIONNAIRE, HOW DIFFICULT HAVE THESE PROBLEMS MADE IT FOR YOU TO DO YOUR WORK, TAKE CARE OF THINGS AT HOME, OR GET ALONG WITH OTHER PEOPLE: NOT DIFFICULT AT ALL
3. WORRYING TOO MUCH ABOUT DIFFERENT THINGS: NOT AT ALL
6. BECOMING EASILY ANNOYED OR IRRITABLE: NOT AT ALL

## 2019-10-24 ASSESSMENT — PATIENT HEALTH QUESTIONNAIRE - PHQ9
5. POOR APPETITE OR OVEREATING: NOT AT ALL
SUM OF ALL RESPONSES TO PHQ QUESTIONS 1-9: 2

## 2019-10-24 ASSESSMENT — MIFFLIN-ST. JEOR: SCORE: 1580.39

## 2019-10-24 NOTE — PROGRESS NOTES
Princess is a 34 year old  female who presents for annual exam.     Besides routine health maintenance, she has no other health concerns today .    HPI:  The patient's PCP is [facility].  Doing well. Thyroid and Lipid panel are being managed by Dr. Yi her PCP. No concerns today. Would like to know how to suppress HPV.      GYNECOLOGIC HISTORY:    Patient's last menstrual period was 2019 (exact date).    Regular menses? yes  Menses every 28 days.  Length of menses: 3-4 days    Her current contraception method is: not sexually active.  She  reports that she has never smoked. She has never used smokeless tobacco.    Patient is not sexually active.  STD testing offered?  Declined  Last PHQ-9 score on record =   PHQ-9 SCORE 10/24/2019   PHQ-9 Total Score 2     Last GAD7 score on record =   TRACEY-7 SCORE 10/24/2019   Total Score 0     Alcohol Score = 4    HEALTH MAINTENANCE:  Cholesterol: (  Cholesterol   Date Value Ref Range Status   10/14/2019 186 <200 mg/dL Final   10/31/2018 199 <200 mg/dL Final      Last Mammo: Not applicable, Result: Not applicable, Next Mammo: Due at age 40   Pap: (  Lab Results   Component Value Date    PAP NIL 10/31/2018    )  HPV 16+  Colonoscopy:  NA, Result: Not applicable, Next Colonoscopy: due at 49.  Dexa:  NA    Health maintenance updated:  No, due for pap    HISTORY:  OB History    Para Term  AB Living   0 0 0 0 0 0   SAB TAB Ectopic Multiple Live Births   0 0 0 0 0       Patient Active Problem List   Diagnosis     Cervical cancer screening     Acquired hypothyroidism     Mixed hyperlipidemia     Cervical high risk HPV (human papillomavirus) test positive     Cervical dysplasia, mild     Past Surgical History:   Procedure Laterality Date     CRYOTHERAPY, CERVICAL  2008     wisdom teeth removed      first surgery was , had bottom wosdom teeth taken out in 2018      Social History     Tobacco Use     Smoking status: Never  "Smoker     Smokeless tobacco: Never Used   Substance Use Topics     Alcohol use: Yes     Comment: Occ      Problem (# of Occurrences) Relation (Name,Age of Onset)    No Known Problems (4) Mother, Father, Brother, Sister            Current Outpatient Medications   Medication Sig     atorvastatin (LIPITOR) 20 MG tablet Take 20 mg by mouth daily     levothyroxine (SYNTHROID/LEVOTHROID) 75 MCG tablet Take 1 tablet (75 mcg) by mouth daily     Multiple Vitamins-Minerals (MULTIVITAMIN PO)      No current facility-administered medications for this visit.      No Known Allergies    Past medical, surgical, social and family histories were reviewed and updated in EPIC.    ROS:   12 point review of systems negative other than symptoms noted below.    EXAM:  /62   Pulse 80   Ht 1.702 m (5' 7\")   Wt 85.3 kg (188 lb)   LMP 09/29/2019 (Exact Date)   Breastfeeding? No   BMI 29.44 kg/m     BMI: Body mass index is 29.44 kg/m .    PHYSICAL EXAM:  Constitutional:   Appearance: Well nourished, well developed, alert, in no acute distress  Neck:  Lymph Nodes:  No lymphadenopathy present    Thyroid:  Gland size normal, nontender, no nodules or masses present  on palpation  Chest:  Respiratory Effort:  Breathing unlabored  Cardiovascular:    Heart: Auscultation:  Regular rate, normal rhythm, no murmurs present  Breasts: Palpation of Breasts and Axillae:  No masses present on palpation, no breast tenderness., Axillary Lymph Nodes:  No lymphadenopathy present. and No nodularity, asymmetry or nipple discharge bilaterally.  Gastrointestinal:   Abdominal Examination:  Abdomen nontender to palpation, tone normal without rigidity or guarding, no masses present, umbilicus without lesions   Liver and Spleen:  No hepatomegaly present, liver nontender to palpation    Hernias:  No hernias present  Lymphatic: Lymph Nodes:  No other lymphadenopathy present  Skin:  General Inspection:  No rashes present, no lesions present, no areas of "  discoloration  Neurologic:    Mental Status:  Oriented X3.  Normal strength and tone, sensory exam                grossly normal, mentation intact and speech normal.    Psychiatric:   Mentation appears normal and affect normal/bright.         Pelvic Exam:  External Genitalia:     Normal appearance for age, no discharge present, no tenderness present, no inflammatory lesions present, color normal  Vagina:     Normal vaginal vault without central or paravaginal defects, no discharge present, no inflammatory lesions present, no masses present  Bladder:     Nontender to palpation  Urethra:   Urethral Body:  Urethra palpation normal, urethra structural support normal   Urethral Meatus:  No erythema or lesions present  Cervix:     Appearance healthy, no lesions present, nontender to palpation, no bleeding present  Uterus:     Uterus: firm, normal sized and nontender, anteverted in position.   Adnexa:     No adnexal tenderness present, no adnexal masses present  Perineum:     Perineum within normal limits, no evidence of trauma, no rashes or skin lesions present  Anus:     Anus within normal limits, no hemorrhoids present  Inguinal Lymph Nodes:     No lymphadenopathy present  Pubic Hair:     Normal pubic hair distribution for age  Genitalia and Groin:     No rashes present, no lesions present, no areas of discoloration, no masses present      COUNSELING:   Reviewed preventive health counseling, as reflected in patient instructions       Healthy diet/nutrition    BMI: Body mass index is 29.44 kg/m .  Weight management plan: Patient was referred to their PCP to discuss a diet and exercise plan.    ASSESSMENT:  34 year old female with satisfactory annual exam.    ICD-10-CM    1. Encounter for gynecological examination without abnormal finding Z01.419    2. Screening for cervical cancer Z12.4 Pap imaged thin layer screen with HPV - recommended age 30 - 65     HPV High Risk Types DNA Cervical   3. Cervical dysplasia, mild  N87.0 Pap imaged thin layer screen with HPV - recommended age 30 - 65     HPV High Risk Types DNA Cervical       PLAN:  Pap smear performed for BELA 1 last year.  Recommended Vitamin D3 and Fish oil supplementation.  Continue to see PCP for thyroid and triglycerides management.    Lucía George MD

## 2019-10-25 ASSESSMENT — ANXIETY QUESTIONNAIRES: GAD7 TOTAL SCORE: 0

## 2019-10-28 LAB
COPATH REPORT: NORMAL
PAP: NORMAL

## 2019-10-29 LAB
FINAL DIAGNOSIS: NORMAL
HPV HR 12 DNA CVX QL NAA+PROBE: NEGATIVE
HPV16 DNA SPEC QL NAA+PROBE: NEGATIVE
HPV18 DNA SPEC QL NAA+PROBE: NEGATIVE
SPECIMEN DESCRIPTION: NORMAL
SPECIMEN SOURCE CVX/VAG CYTO: NORMAL

## 2019-10-30 ENCOUNTER — MYC MEDICAL ADVICE (OUTPATIENT)
Dept: OBGYN | Facility: CLINIC | Age: 34
End: 2019-10-30

## 2019-12-09 DIAGNOSIS — E03.9 ACQUIRED HYPOTHYROIDISM: ICD-10-CM

## 2019-12-09 LAB
T4 FREE SERPL-MCNC: 0.94 NG/DL (ref 0.76–1.46)
TSH SERPL DL<=0.005 MIU/L-ACNC: 1.94 MU/L (ref 0.4–4)

## 2019-12-09 PROCEDURE — 36415 COLL VENOUS BLD VENIPUNCTURE: CPT | Performed by: FAMILY MEDICINE

## 2019-12-09 PROCEDURE — 84443 ASSAY THYROID STIM HORMONE: CPT | Performed by: FAMILY MEDICINE

## 2019-12-09 PROCEDURE — 84439 ASSAY OF FREE THYROXINE: CPT | Performed by: FAMILY MEDICINE

## 2019-12-10 DIAGNOSIS — E03.9 ACQUIRED HYPOTHYROIDISM: ICD-10-CM

## 2019-12-10 RX ORDER — LEVOTHYROXINE SODIUM 75 UG/1
75 TABLET ORAL DAILY
Qty: 30 TABLET | Refills: 2 | Status: CANCELLED | OUTPATIENT
Start: 2019-12-10

## 2019-12-10 RX ORDER — LEVOTHYROXINE SODIUM 75 UG/1
75 TABLET ORAL DAILY
Qty: 90 TABLET | Refills: 3 | Status: SHIPPED | OUTPATIENT
Start: 2019-12-10 | End: 2020-12-09

## 2019-12-10 NOTE — TELEPHONE ENCOUNTER
"Requested Prescriptions   Pending Prescriptions Disp Refills     levothyroxine (SYNTHROID/LEVOTHROID) 75 MCG tablet  Last Written Prescription Date:  10/16/2019  Last Fill Quantity: 30 tablet,  # refills: 2   Last office visit: 10/14/2019 with prescribing provider:  Kassidy   Future Office Visit:     30 tablet 2     Sig: Take 1 tablet (75 mcg) by mouth daily       Thyroid Protocol Passed - 12/10/2019  8:56 AM        Passed - Patient is 12 years or older        Passed - Recent (12 mo) or future (30 days) visit within the authorizing provider's specialty     Patient has had an office visit with the authorizing provider or a provider within the authorizing providers department within the previous 12 mos or has a future within next 30 days. See \"Patient Info\" tab in inbasket, or \"Choose Columns\" in Meds & Orders section of the refill encounter.              Passed - Medication is active on med list        Passed - Normal TSH on file in past 12 months     Recent Labs   Lab Test 12/09/19  0826   TSH 1.94              Passed - No active pregnancy on record     If patient is pregnant or has had a positive pregnancy test, please check TSH.          Passed - No positive pregnancy test in past 12 months     If patient is pregnant or has had a positive pregnancy test, please check TSH.             "

## 2020-03-05 ENCOUNTER — MYC MEDICAL ADVICE (OUTPATIENT)
Dept: FAMILY MEDICINE | Facility: CLINIC | Age: 35
End: 2020-03-05

## 2020-03-05 DIAGNOSIS — E78.1 PURE HYPERTRIGLYCERIDEMIA: Primary | ICD-10-CM

## 2020-03-05 RX ORDER — ATORVASTATIN CALCIUM 20 MG/1
20 TABLET, FILM COATED ORAL DAILY
Qty: 90 TABLET | Refills: 0 | Status: SHIPPED | OUTPATIENT
Start: 2020-03-05 | End: 2020-06-01

## 2020-03-05 NOTE — TELEPHONE ENCOUNTER
"Last office visit 10/14/2019.    Routing refill request to provider for review/approval because:  Medication is reported/historical          Requested Prescriptions   Pending Prescriptions Disp Refills     atorvastatin (LIPITOR) 20 MG tablet 90 tablet 1     Sig: Take 1 tablet (20 mg) by mouth daily       Statins Protocol Passed - 3/5/2020 11:09 AM        Passed - LDL on file in past 12 months     Recent Labs   Lab Test 10/14/19  0837   LDL 74             Passed - No abnormal creatine kinase in past 12 months     No lab results found.             Passed - Recent (12 mo) or future (30 days) visit within the authorizing provider's specialty     Patient has had an office visit with the authorizing provider or a provider within the authorizing providers department within the previous 12 mos or has a future within next 30 days. See \"Patient Info\" tab in inbasket, or \"Choose Columns\" in Meds & Orders section of the refill encounter.              Passed - Medication is active on med list        Passed - Patient is age 18 or older        Passed - No active pregnancy on record        Passed - No positive pregnancy test in past 12 months          "

## 2020-03-05 NOTE — TELEPHONE ENCOUNTER
I would like her to get fasting lipids and CMP re-checked to see how her cholesterol is doing on the statin and to make sure her liver enzymes are doing OK.    I will refill this med, but would still like her to get these labs re-checked.

## 2020-06-01 DIAGNOSIS — E78.1 PURE HYPERTRIGLYCERIDEMIA: ICD-10-CM

## 2020-06-01 RX ORDER — ATORVASTATIN CALCIUM 20 MG/1
20 TABLET, FILM COATED ORAL DAILY
Qty: 90 TABLET | Refills: 0 | Status: SHIPPED | OUTPATIENT
Start: 2020-06-01 | End: 2020-10-30

## 2020-10-02 NOTE — PROGRESS NOTES
Princess is a 35 year old  female who presents for annual exam.     Besides routine health maintenance, she has no other health concerns today .    HPI:  The patient's PCP is  [facility].  Doing well. Would like a full panel of STD testing. Reports using condoms. 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 No other concerns. Would like to get fasting labs with her PCP.      GYNECOLOGIC HISTORY:    Patient's last menstrual period was 10/04/2020.    Regular menses? yes  Menses every 28-30 days.  Length of menses: 4 days    Her current contraception method is: none.  She  reports that she has never smoked. She has never used smokeless tobacco.    Patient is not sexually active.  STD testing offered?  Accepted  Last PHQ-9 score on record =   PHQ-9 SCORE 10/5/2020   PHQ-9 Total Score 1     Last GAD7 score on record =   TRACEY-7 SCORE 10/5/2020   Total Score 0     Alcohol Score = 2    HEALTH MAINTENANCE:  Cholesterol:   Recent Labs   Lab Test 10/14/19  0837 10/31/18  1045   CHOL 186 199   HDL 55 47*   LDL 74 91   TRIG 283* 303*       Last Mammo: Not applicable, Result: Not applicable, Next Mammo: Due at age 39   Pap:   Lab Results   Component Value Date    PAP NIL 10/24/2019    PAP NIL 10/31/2018     Colonoscopy:  NA, Result: Not applicable, Next Colonoscopy: age 49 years.  Dexa:  NA    Health maintenance updated:  yes    HISTORY:  OB History    Para Term  AB Living   0 0 0 0 0 0   SAB TAB Ectopic Multiple Live Births   0 0 0 0 0       Patient Active Problem List   Diagnosis     Cervical cancer screening     Acquired hypothyroidism     Mixed hyperlipidemia     Cervical high risk HPV (human papillomavirus) test positive     Cervical dysplasia, mild     Past Surgical History:   Procedure Laterality Date     CRYOTHERAPY, CERVICAL       wisdom teeth removed      first surgery was , had bottom wosdom teeth taken out in 2018      Social History     Tobacco Use      "Smoking status: Never Smoker     Smokeless tobacco: Never Used   Substance Use Topics     Alcohol use: Yes     Comment: Occ      Problem (# of Occurrences) Relation (Name,Age of Onset)    No Known Problems (8) Mother, Father, Brother, Sister, Maternal Grandmother, Maternal Grandfather, Paternal Grandmother, Other            Current Outpatient Medications   Medication Sig     atorvastatin (LIPITOR) 20 MG tablet Take 1 tablet (20 mg) by mouth daily     levothyroxine (SYNTHROID/LEVOTHROID) 75 MCG tablet Take 1 tablet (75 mcg) by mouth daily     Multiple Vitamins-Minerals (MULTIVITAMIN PO)      No current facility-administered medications for this visit.      No Known Allergies    Past medical, surgical, social and family histories were reviewed and updated in EPIC.    ROS:   12 point review of systems negative other than symptoms noted below or in the HPI.  No urinary frequency or dysuria, bladder or kidney problems    EXAM:  /70   Ht 1.676 m (5' 6\")   Wt 83.7 kg (184 lb 9.6 oz)   LMP 10/04/2020   Breastfeeding No   BMI 29.80 kg/m     BMI: Body mass index is 29.8 kg/m .    PHYSICAL EXAM:  Constitutional:   Appearance: Well nourished, well developed, alert, in no acute distress  Neck:  Lymph Nodes:  No lymphadenopathy present    Thyroid:  Gland size normal, nontender, no nodules or masses present on palpation  Chest:  Respiratory Effort:  Breathing unlabored, CTAB  Cardiovascular:    Heart: Auscultation:  Regular rate, normal rhythm, no murmurs present  Breasts: Inspection of Breasts:  No lymphadenopathy present., Palpation of Breasts and Axillae:  No masses present on palpation, no breast tenderness., Axillary Lymph Nodes:  No lymphadenopathy present. and No nodularity, asymmetry or nipple discharge bilaterally.  Gastrointestinal:   Abdominal Examination:  Abdomen nontender to palpation, tone normal without rigidity or guarding, no masses present, umbilicus without lesions   Liver and Spleen:  No " hepatomegaly present, liver nontender to palpation    Hernias:  No hernias present  Lymphatic: Lymph Nodes:  No other lymphadenopathy present  Skin:  General Inspection:  No rashes present, no lesions present, no areas of  discoloration  Neurologic:    Mental Status:  Oriented X3.  Normal strength and tone, sensory exam                grossly normal, mentation intact and speech normal.    Psychiatric:   Mentation appears normal and affect normal/bright.         Pelvic Exam:  External Genitalia:     Normal appearance for age, no discharge present, no tenderness present, no inflammatory lesions present, color normal  Vagina:     Normal vaginal vault without central or paravaginal defects, no discharge present, no inflammatory lesions present, no masses present  Bladder:     Nontender to palpation  Urethra:   Urethral Body:  Urethra palpation normal, urethra structural support normal   Urethral Meatus:  No erythema or lesions present  Cervix:     Appearance healthy, no lesions present, nontender to palpation, no bleeding present  Uterus:     Uterus: firm, normal sized and nontender, midplane in position.   Adnexa:     No adnexal tenderness present, no adnexal masses present  Perineum:     Perineum within normal limits, no evidence of trauma, no rashes or skin lesions present  Anus:     Anus within normal limits, no hemorrhoids present  Inguinal Lymph Nodes:     No lymphadenopathy present  Pubic Hair:     Normal pubic hair distribution for age  Genitalia and Groin:     No rashes present, no lesions present, no areas of discoloration, no masses present      COUNSELING:   Reviewed preventive health counseling, as reflected in patient instructions       Regular exercise       Healthy diet/nutrition    BMI: Body mass index is 29.8 kg/m .  Weight management plan: Patient was referred to their PCP to discuss a diet and exercise plan.    ASSESSMENT:  35 year old female with satisfactory annual exam.    ICD-10-CM    1.  Encounter for gynecological examination without abnormal finding  Z01.419    2. Screen for STD (sexually transmitted disease)  Z11.3 Pap imaged thin layer screen with HPV - recommended age 30 - 65     HPV High Risk Types DNA Cervical     Treponema Abs w Reflex to RPR and Titer     HIV Antigen Antibody Combo     Hepatitis B Surface  Antigen     Hepatitis C Antibody     NEISSERIA GONORRHOEA PCR   3. Screening for chlamydial disease  Z11.8 CHLAMYDIA TRACHOMATIS PCR       PLAN:  Pap smear performed due to BELA 1 in 2018 after HPV 16.  STD screening as requested.    Lucía George MD

## 2020-10-05 ENCOUNTER — OFFICE VISIT (OUTPATIENT)
Dept: OBGYN | Facility: CLINIC | Age: 35
End: 2020-10-05
Payer: COMMERCIAL

## 2020-10-05 VITALS
WEIGHT: 184.6 LBS | DIASTOLIC BLOOD PRESSURE: 70 MMHG | HEIGHT: 66 IN | SYSTOLIC BLOOD PRESSURE: 102 MMHG | BODY MASS INDEX: 29.67 KG/M2

## 2020-10-05 DIAGNOSIS — Z11.8 SCREENING FOR CHLAMYDIAL DISEASE: ICD-10-CM

## 2020-10-05 DIAGNOSIS — Z01.419 ENCOUNTER FOR GYNECOLOGICAL EXAMINATION WITHOUT ABNORMAL FINDING: Primary | ICD-10-CM

## 2020-10-05 DIAGNOSIS — Z11.3 SCREEN FOR STD (SEXUALLY TRANSMITTED DISEASE): ICD-10-CM

## 2020-10-05 PROCEDURE — 99000 SPECIMEN HANDLING OFFICE-LAB: CPT | Performed by: OBSTETRICS & GYNECOLOGY

## 2020-10-05 PROCEDURE — 88175 CYTOPATH C/V AUTO FLUID REDO: CPT | Performed by: OBSTETRICS & GYNECOLOGY

## 2020-10-05 PROCEDURE — 87491 CHLMYD TRACH DNA AMP PROBE: CPT | Performed by: OBSTETRICS & GYNECOLOGY

## 2020-10-05 PROCEDURE — 87624 HPV HI-RISK TYP POOLED RSLT: CPT | Performed by: OBSTETRICS & GYNECOLOGY

## 2020-10-05 PROCEDURE — 87340 HEPATITIS B SURFACE AG IA: CPT | Performed by: OBSTETRICS & GYNECOLOGY

## 2020-10-05 PROCEDURE — 87389 HIV-1 AG W/HIV-1&-2 AB AG IA: CPT | Performed by: OBSTETRICS & GYNECOLOGY

## 2020-10-05 PROCEDURE — 99395 PREV VISIT EST AGE 18-39: CPT | Performed by: OBSTETRICS & GYNECOLOGY

## 2020-10-05 PROCEDURE — 36415 COLL VENOUS BLD VENIPUNCTURE: CPT | Performed by: OBSTETRICS & GYNECOLOGY

## 2020-10-05 PROCEDURE — 86803 HEPATITIS C AB TEST: CPT | Performed by: OBSTETRICS & GYNECOLOGY

## 2020-10-05 PROCEDURE — 87591 N.GONORRHOEAE DNA AMP PROB: CPT | Performed by: OBSTETRICS & GYNECOLOGY

## 2020-10-05 PROCEDURE — 86780 TREPONEMA PALLIDUM: CPT | Mod: 90 | Performed by: OBSTETRICS & GYNECOLOGY

## 2020-10-05 ASSESSMENT — MIFFLIN-ST. JEOR: SCORE: 1544.09

## 2020-10-05 ASSESSMENT — ANXIETY QUESTIONNAIRES
GAD7 TOTAL SCORE: 0
1. FEELING NERVOUS, ANXIOUS, OR ON EDGE: NOT AT ALL
5. BEING SO RESTLESS THAT IT IS HARD TO SIT STILL: NOT AT ALL
IF YOU CHECKED OFF ANY PROBLEMS ON THIS QUESTIONNAIRE, HOW DIFFICULT HAVE THESE PROBLEMS MADE IT FOR YOU TO DO YOUR WORK, TAKE CARE OF THINGS AT HOME, OR GET ALONG WITH OTHER PEOPLE: NOT DIFFICULT AT ALL
6. BECOMING EASILY ANNOYED OR IRRITABLE: NOT AT ALL
7. FEELING AFRAID AS IF SOMETHING AWFUL MIGHT HAPPEN: NOT AT ALL
3. WORRYING TOO MUCH ABOUT DIFFERENT THINGS: NOT AT ALL
2. NOT BEING ABLE TO STOP OR CONTROL WORRYING: NOT AT ALL

## 2020-10-05 ASSESSMENT — PATIENT HEALTH QUESTIONNAIRE - PHQ9
SUM OF ALL RESPONSES TO PHQ QUESTIONS 1-9: 1
5. POOR APPETITE OR OVEREATING: NOT AT ALL

## 2020-10-06 LAB
C TRACH DNA SPEC QL NAA+PROBE: NEGATIVE
HBV SURFACE AG SERPL QL IA: NONREACTIVE
HCV AB SERPL QL IA: NONREACTIVE
HIV 1+2 AB+HIV1 P24 AG SERPL QL IA: NONREACTIVE
N GONORRHOEA DNA SPEC QL NAA+PROBE: NEGATIVE
SPECIMEN SOURCE: NORMAL
SPECIMEN SOURCE: NORMAL
T PALLIDUM AB SER QL: NONREACTIVE

## 2020-10-06 ASSESSMENT — ANXIETY QUESTIONNAIRES: GAD7 TOTAL SCORE: 0

## 2020-10-08 LAB
COPATH REPORT: NORMAL
PAP: NORMAL

## 2020-10-16 ENCOUNTER — OFFICE VISIT (OUTPATIENT)
Dept: FAMILY MEDICINE | Facility: CLINIC | Age: 35
End: 2020-10-16
Payer: COMMERCIAL

## 2020-10-16 VITALS
HEART RATE: 72 BPM | SYSTOLIC BLOOD PRESSURE: 120 MMHG | WEIGHT: 183 LBS | HEIGHT: 66 IN | DIASTOLIC BLOOD PRESSURE: 74 MMHG | TEMPERATURE: 98.5 F | RESPIRATION RATE: 14 BRPM | BODY MASS INDEX: 29.41 KG/M2

## 2020-10-16 DIAGNOSIS — E03.9 ACQUIRED HYPOTHYROIDISM: ICD-10-CM

## 2020-10-16 DIAGNOSIS — E78.2 MIXED HYPERLIPIDEMIA: ICD-10-CM

## 2020-10-16 DIAGNOSIS — Z00.00 ENCOUNTER FOR ROUTINE ADULT HEALTH EXAMINATION WITHOUT ABNORMAL FINDINGS: Primary | ICD-10-CM

## 2020-10-16 LAB
ALBUMIN SERPL-MCNC: 4.1 G/DL (ref 3.4–5)
ALP SERPL-CCNC: 45 U/L (ref 40–150)
ALT SERPL W P-5'-P-CCNC: 30 U/L (ref 0–50)
ANION GAP SERPL CALCULATED.3IONS-SCNC: 9 MMOL/L (ref 3–14)
AST SERPL W P-5'-P-CCNC: 15 U/L (ref 0–45)
BILIRUB SERPL-MCNC: 1.2 MG/DL (ref 0.2–1.3)
BUN SERPL-MCNC: 12 MG/DL (ref 7–30)
CALCIUM SERPL-MCNC: 9.3 MG/DL (ref 8.5–10.1)
CHLORIDE SERPL-SCNC: 109 MMOL/L (ref 94–109)
CHOLEST SERPL-MCNC: 191 MG/DL
CO2 SERPL-SCNC: 21 MMOL/L (ref 20–32)
CREAT SERPL-MCNC: 0.92 MG/DL (ref 0.52–1.04)
GFR SERPL CREATININE-BSD FRML MDRD: 80 ML/MIN/{1.73_M2}
GLUCOSE SERPL-MCNC: 100 MG/DL (ref 70–99)
HDLC SERPL-MCNC: 57 MG/DL
LDLC SERPL CALC-MCNC: 90 MG/DL
NONHDLC SERPL-MCNC: 134 MG/DL
POTASSIUM SERPL-SCNC: 3.6 MMOL/L (ref 3.4–5.3)
PROT SERPL-MCNC: 7.6 G/DL (ref 6.8–8.8)
SODIUM SERPL-SCNC: 139 MMOL/L (ref 133–144)
TRIGL SERPL-MCNC: 222 MG/DL
TSH SERPL DL<=0.005 MIU/L-ACNC: 2.68 MU/L (ref 0.4–4)

## 2020-10-16 PROCEDURE — 36415 COLL VENOUS BLD VENIPUNCTURE: CPT | Performed by: FAMILY MEDICINE

## 2020-10-16 PROCEDURE — 99212 OFFICE O/P EST SF 10 MIN: CPT | Mod: 25 | Performed by: FAMILY MEDICINE

## 2020-10-16 PROCEDURE — 84443 ASSAY THYROID STIM HORMONE: CPT | Performed by: FAMILY MEDICINE

## 2020-10-16 PROCEDURE — 80053 COMPREHEN METABOLIC PANEL: CPT | Performed by: FAMILY MEDICINE

## 2020-10-16 PROCEDURE — 99395 PREV VISIT EST AGE 18-39: CPT | Performed by: FAMILY MEDICINE

## 2020-10-16 PROCEDURE — 80061 LIPID PANEL: CPT | Performed by: FAMILY MEDICINE

## 2020-10-16 ASSESSMENT — MIFFLIN-ST. JEOR: SCORE: 1536.83

## 2020-10-16 NOTE — PROGRESS NOTES
SUBJECTIVE:   CC: Princess Bonner is an 35 year old woman who presents for preventive health visit.       Patient has been advised of split billing requirements and indicates understanding: Yes  Healthy Habits:     Getting at least 3 servings of Calcium per day:  Yes    Bi-annual eye exam:  NO    Dental care twice a year:  Yes    Sleep apnea or symptoms of sleep apnea:  None    Diet:  Regular (no restrictions)    Frequency of exercise:  4-5 days/week    Duration of exercise:  45-60 minutes    Taking medications regularly:  Yes    Medication side effects:  None    PHQ-2 Total Score: 0    Additional concerns today:  No    Today's PHQ-2 Score:   PHQ-2 ( 1999 Pfizer) 10/13/2020   Q1: Little interest or pleasure in doing things 0   Q2: Feeling down, depressed or hopeless 0   PHQ-2 Score 0   Q1: Little interest or pleasure in doing things Not at all   Q2: Feeling down, depressed or hopeless Not at all   PHQ-2 Score 0       No issues or side effects when taking Levothyroxine and statin; thyroid feels well controlled.    Abuse: Current or Past (Physical, Sexual or Emotional) - No  Do you feel safe in your environment? Yes        Social History     Tobacco Use     Smoking status: Never Smoker     Smokeless tobacco: Never Used   Substance Use Topics     Alcohol use: Yes     Comment: Occ     If you drink alcohol do you typically have >3 drinks per day or >7 drinks per week? No    No flowsheet data found.    Reviewed orders with patient.  Reviewed health maintenance and updated orders accordingly - Yes  Lab work is in process  Labs reviewed in EPIC        Pertinent mammograms are reviewed under the imaging tab.  History of abnormal Pap smear: YES - updated in Problem List and Health Maintenance accordingly  PAP / HPV Latest Ref Rng & Units 10/5/2020 10/24/2019 10/31/2018   PAP - NIL NIL NIL   HPV 16 DNA NEG:Negative Negative Negative Positive(A)   HPV 18 DNA NEG:Negative Negative Negative Negative   OTHER HR HPV NEG:Negative  "Negative Negative Negative     Reviewed and updated as needed this visit by clinical staff  Tobacco  Allergies  Meds  Problems  Med Hx  Surg Hx  Fam Hx  Soc Hx          Reviewed and updated as needed this visit by Provider  Tobacco  Allergies  Meds  Problems  Med Hx  Surg Hx  Fam Hx           Past Medical History:   Diagnosis Date     Cervical dysplasia, mild 2018     Cervical high risk HPV (human papillomavirus) test positive 10/31/2018    10/31/18:See problem list.       Past Surgical History:   Procedure Laterality Date     CRYOTHERAPY, CERVICAL  2008     wisdom teeth removed      first surgery was , had bottom wosdom teeth taken out in 2018     OB History    Para Term  AB Living   0 0 0 0 0 0   SAB TAB Ectopic Multiple Live Births   0 0 0 0 0       Review of Systems  CONSTITUTIONAL: NEGATIVE for fever, chills, change in weight  INTEGUMENTARU/SKIN: NEGATIVE for worrisome rashes, moles or lesions  EYES: NEGATIVE for vision changes or irritation  ENT: NEGATIVE for ear, mouth and throat problems  RESP: NEGATIVE for significant cough or SOB  BREAST: NEGATIVE for masses, tenderness or discharge  CV: NEGATIVE for chest pain, palpitations or peripheral edema  GI: NEGATIVE for nausea, abdominal pain, heartburn, or change in bowel habits  : NEGATIVE for unusual urinary or vaginal symptoms. Periods are regular.  MUSCULOSKELETAL: NEGATIVE for significant arthralgias or myalgia  NEURO: NEGATIVE for weakness, dizziness or paresthesias  HEME/ALLERGY/IMMUNE: NEGATIVE for bleeding problems  PSYCHIATRIC: NEGATIVE for changes in mood or affect     OBJECTIVE:   /74   Pulse 72   Temp 98.5  F (36.9  C) (Tympanic)   Resp 14   Ht 1.676 m (5' 6\")   Wt 83 kg (183 lb)   LMP 10/04/2020   BMI 29.54 kg/m    Physical Exam  GENERAL: healthy, alert and no distress  EYES: Eyes grossly normal to inspection, PERRL and conjunctivae and sclerae normal  HENT: normal cephalic/atraumatic and " wearing face mask  NECK: no adenopathy, no asymmetry, masses, or scars and thyroid normal to palpation  RESP: lungs clear to auscultation - no rales, rhonchi or wheezes  BREAST: deferred  CV: regular rate and rhythm, normal S1 S2, no S3 or S4, no murmur, click or rub, no peripheral edema and peripheral pulses strong  ABDOMEN: soft, nontender, no hepatosplenomegaly, no masses and bowel sounds normal   (female): deferred  MS: no gross musculoskeletal defects noted, no edema  SKIN: no suspicious lesions or rashes  NEURO: Normal strength and tone, mentation intact and speech normal  PSYCH: mentation appears normal, affect normal/bright    Diagnostic Test Results:  Labs reviewed in Epic    ASSESSMENT/PLAN:   Princess was seen today for physical.    Diagnoses and all orders for this visit:    Encounter for routine adult health examination without abnormal findings    Mixed hyperlipidemia  -     Lipid Profile  -     Comprehensive metabolic panel (BMP + Alb, Alk Phos, ALT, AST, Total. Bili, TP)    Acquired hypothyroidism  -     TSH with free T4 reflex  -     Comprehensive metabolic panel (BMP + Alb, Alk Phos, ALT, AST, Total. Bili, TP)      HLD:  --fasting lipids  --continue statin; may need to adjust dose based on lab result(s)    Hypothyroid:  --CMP, TSH/T4  --Continue Levothyroxine; may need to adjust dose based on lab result(s)      In addition to the preventive visit, 10 minutes of the appointment were spent evaluating and developing a treatment plan for her additional concern(s).      Patient has been advised of split billing requirements and indicates understanding: Yes  COUNSELING:  Reviewed preventive health counseling, as reflected in patient instructions  Special attention given to:        Regular exercise       Healthy diet/nutrition       Vision screening       Hearing screening       Immunizations: up to date       Contraception       Osteoporosis Prevention/Bone Health       Safe sex practices/STD prevention    "    The ASCVD Risk score (Leabeth OVALLES Jr., et al., 2013) failed to calculate for the following reasons:    The 2013 ASCVD risk score is only valid for ages 40 to 79    Estimated body mass index is 29.54 kg/m  as calculated from the following:    Height as of this encounter: 1.676 m (5' 6\").    Weight as of this encounter: 83 kg (183 lb).    Weight management plan: Discussed healthy diet and exercise guidelines    She reports that she has never smoked. She has never used smokeless tobacco.      Counseling Resources:  ATP IV Guidelines  Pooled Cohorts Equation Calculator  Breast Cancer Risk Calculator  BRCA-Related Cancer Risk Assessment: FHS-7 Tool  FRAX Risk Assessment  ICSI Preventive Guidelines  Dietary Guidelines for Americans, 2010  USDA's MyPlate  ASA Prophylaxis  Lung CA Screening    Eladia Yi DO  [facility]  "

## 2020-10-30 DIAGNOSIS — E78.1 PURE HYPERTRIGLYCERIDEMIA: ICD-10-CM

## 2020-10-30 RX ORDER — ATORVASTATIN CALCIUM 20 MG/1
20 TABLET, FILM COATED ORAL DAILY
Qty: 90 TABLET | Refills: 2 | Status: SHIPPED | OUTPATIENT
Start: 2020-10-30 | End: 2021-01-07

## 2020-11-09 ENCOUNTER — VIRTUAL VISIT (OUTPATIENT)
Dept: FAMILY MEDICINE | Facility: OTHER | Age: 35
End: 2020-11-09
Payer: COMMERCIAL

## 2020-11-09 PROCEDURE — 99421 OL DIG E/M SVC 5-10 MIN: CPT | Performed by: PHYSICIAN ASSISTANT

## 2020-11-09 NOTE — PROGRESS NOTES
"Date: 2020 13:03:17  Clinician: Nico Brennan  Clinician NPI: [id number]  Patient: Princess Bonner  Patient : 1985  Patient Address: [address] [place] [id number]  Patient Phone: [phone]  Visit Protocol: URI  Patient Summary:  Princess is a 35 year old ( : 1985 ) female who initiated a [facility] Visit for COVID-19 (Coronavirus) evaluation and screening. When asked the question \"Please sign me up to receive news, health information and promotions from [facility].\", Princess responded \"No\".    Princess states her symptoms started 1-2 days ago.   Her symptoms consist of myalgia, a sore throat, diarrhea, a cough, and nasal congestion. She is experiencing mild difficulty breathing with activities but can speak normally in full sentences.   Symptom details     Nasal secretions: The color of her mucus is green.    Cough: Princess coughs every 5-10 minutes and her cough is not more bothersome at night. Phlegm does not come into her throat when she coughs. She believes her cough is caused by post-nasal drip.     Sore throat: Princess reports having mild throat pain (1-3 on a 10 point pain scale), does not have exudate on her tonsils, and can swallow liquids. The lymph nodes in her neck are not enlarged. A rash has not appeared on the skin since the sore throat started.      Princess denies having vomiting, rhinitis, facial pain or pressure, chills, malaise, teeth pain, ageusia, ear pain, headache, wheezing, fever, enlarged lymph nodes, nausea, and anosmia. She also denies taking antibiotic medication in the past month and having recent facial or sinus surgery in the past 60 days.   Precipitating events  Within the past week, Princess has not been exposed to someone with strep throat. She has not recently been exposed to someone with influenza. Princess has not been in close contact with any high risk individuals.   Pertinent COVID-19 (Coronavirus) information  Princess does not work or volunteer as healthcare worker or a " . In the past 14 days, Princess has not worked or volunteered at a healthcare facility or group living setting.   In the past 14 days, she also has not lived in a congregate living setting.   Princess has not had a close contact with a laboratory-confirmed COVID-19 patient within 14 days of symptom onset.    Since December 2019, Princess has not been tested for COVID-19 and has not had upper respiratory infection or influenza-like illness.   Pertinent medical history  Princess does not get yeast infections when she takes antibiotics.   Princess does not need a return to work/school note.   Weight: 180 lbs   Princess does not smoke or use smokeless tobacco.   She denies pregnancy and denies breastfeeding. She is currently menstruating.   Weight: 180 lbs    MEDICATIONS: levothyroxine oral, ALLERGIES: NKDA  Clinician Response:  Dear Princess,   Your symptoms show that you may have coronavirus (COVID-19). This illness can cause fever, cough and trouble breathing. Many people get a mild case and get better on their own. Some people can get very sick.  What should I do?  We would like to test you for this virus.   1. Please call [phone] to schedule your visit. Explain that you were referred by [facility] to have a COVID-19 test. Be ready to [facility].  * If you need to schedule in [facility] please call [phone] or for [employer] employees please call [phone].  * If you need to schedule in the [place] area please call [phone]. [place] employees call [phone].  The following will serve as your written order for this COVID Test, ordered by me, for the indication of suspected COVID [Z20.828]: The test will be ordered in [employer], our electronic health record, after you are scheduled. It will show as ordered and authorized by Blake Alvarenga MD.  Order: COVID-19 (Coronavirus) PCR for SYMPTOMATIC testing from [facility].   2. When it's time for your COVID test:  Stay at least 6 feet away from others.  "(If someone will drive you to your test, stay in the backseat, as far away from the  as you can.)   Cover your mouth and nose with a mask, tissue or washcloth.  Go straight to the testing site. Don't make any stops on the way there or back.      3.Starting now: Stay home and away from others (self-isolate) until:   You've had no fever---and no medicine that reduces fever---for one full day (24 hours). And...   Your other symptoms have gotten better. For example, your cough or breathing has improved. And...   At least 10 days have passed since your symptoms started.       During this time, don't leave the house except for testing or medical care.   Stay in your own room, even for meals. Use your own bathroom if you can.   Stay away from others in your home. No hugging, kissing or shaking hands. No visitors.  Don't go to work, school or anywhere else.    Clean \"high touch\" surfaces often (doorknobs, counters, handles, etc.). Use a household cleaning spray or wipes. You'll find a full list of  on the EPA website: www.epa.gov/pesticide-registration/list-n-disinfectants-use-against-sars-cov-2.   Cover your mouth and nose with a mask, tissue or washcloth to avoid spreading germs.  Wash your hands and face often. Use soap and water.  Caregivers in these groups are at risk for severe illness due to COVID-19:  o People 65 years and older  o People who live in a nursing home or long-term care facility  o People with chronic disease (lung, heart, cancer, diabetes, kidney, liver, immunologic)  o People who have a weakened immune system, including those who:   Are in cancer treatment  Take medicine that weakens the immune system, such as corticosteroids  Had a bone marrow or organ transplant  Have an immune deficiency  Have poorly controlled HIV or AIDS  Are obese (body mass index of 40 or higher)  Smoke regularly   o Caregivers should wear gloves while washing dishes, handling laundry and cleaning bedrooms and " bathrooms.  o Use caution when washing and drying laundry: Don't shake dirty laundry, and use the warmest water setting that you can.  o For more tips, go to www.cdc.gov/coronavirus/2019-ncov/downloads/10Things.pdf.       How can I take care of myself?   Get lots of rest. Drink extra fluids (unless a doctor has told you not to).   Take Tylenol (acetaminophen) for fever or pain. If you have liver or kidney problems, ask your family doctor if it's okay to take Tylenol.   Adults can take either:    650 mg (two 325 mg pills) every 4 to 6 hours, or...   1,000 mg (two 500 mg pills) every 8 hours as needed.    Note: Don't take more than 3,000 mg in one day. Acetaminophen is found in many medicines (both prescribed and over-the-counter medicines). Read all labels to be sure you don't take too much.   For children, check the Tylenol bottle for the right dose. The dose is based on the child's age or weight.    If you have other health problems (like cancer, heart failure, an organ transplant or severe kidney disease): Call your specialty clinic if you don't feel better in the next 2 days.       Know when to call 911. Emergency warning signs include:    Trouble breathing or shortness of breath Pain or pressure in the chest that doesn't go away Feeling confused like you haven't felt before, or not being able to wake up Bluish-colored lips or face.  Where can I get more information?   [facility] -- About COVID-19: [email or web address]   CDC -- What to Do If You're Sick: www.cdc.gov/coronavirus/2019-ncov/about/steps-when-sick.html   CDC -- Ending Home Isolation: www.cdc.gov/coronavirus/2019-ncov/hcp/disposition-in-home-patients.html   CDC -- Caring for Someone: www.cdc.gov/coronavirus/2019-ncov/if-you-are-sick/care-for-someone.html   [facility] -- Interim Guidance for Hospital Discharge to Home: www.health.[facility].mn./diseases/coronavirus/hcp/hospdischarge.pdf   [facility] clinical trials (COVID-19  research studies): clinicalaffairs.[facility]-clinical-trials    Below are the COVID-19 hotlines at the [place] Department of [facility] ([facility]). Interpreters are available.    For health questions: Call 695-663-3707 or 1-281.489.1856 (7 a.m. to 7 p.m.) For questions about schools and childcare: Call 597-519-0555 or 1-169.800.1235 (7 a.m. to 7 p.m.)    Diagnosis: Contact with and (suspected) exposure to other viral communicable diseases  Diagnosis ICD: Z20.828

## 2021-07-29 DIAGNOSIS — E78.1 PURE HYPERTRIGLYCERIDEMIA: ICD-10-CM

## 2021-07-29 RX ORDER — ATORVASTATIN CALCIUM 20 MG/1
20 TABLET, FILM COATED ORAL DAILY
Qty: 30 TABLET | Refills: 0 | Status: SHIPPED | OUTPATIENT
Start: 2021-07-29 | End: 2021-09-21

## 2021-07-29 NOTE — TELEPHONE ENCOUNTER
Failed protocol.  please route to  team if patient needs an appointment     Meghna AGYLERN BSN  [facility]  [phone]

## 2021-09-09 ASSESSMENT — ENCOUNTER SYMPTOMS
DIZZINESS: 0
COUGH: 0
FEVER: 0
EYE PAIN: 0
PALPITATIONS: 0
MYALGIAS: 0
DIARRHEA: 0
WEAKNESS: 0
HEADACHES: 0
JOINT SWELLING: 0
PARESTHESIAS: 0
SORE THROAT: 0
SHORTNESS OF BREATH: 0
FREQUENCY: 0
ABDOMINAL PAIN: 0
DYSURIA: 0
HEMATOCHEZIA: 0
NAUSEA: 0
NERVOUS/ANXIOUS: 0
CONSTIPATION: 0
ARTHRALGIAS: 0
BREAST MASS: 0
HEARTBURN: 0
HEMATURIA: 0
CHILLS: 0

## 2021-09-14 ENCOUNTER — OFFICE VISIT (OUTPATIENT)
Dept: FAMILY MEDICINE | Facility: CLINIC | Age: 36
End: 2021-09-14
Payer: COMMERCIAL

## 2021-09-14 VITALS
HEIGHT: 67 IN | WEIGHT: 188 LBS | OXYGEN SATURATION: 94 % | RESPIRATION RATE: 16 BRPM | SYSTOLIC BLOOD PRESSURE: 116 MMHG | TEMPERATURE: 97.8 F | DIASTOLIC BLOOD PRESSURE: 80 MMHG | BODY MASS INDEX: 29.51 KG/M2 | HEART RATE: 74 BPM

## 2021-09-14 DIAGNOSIS — E03.9 ACQUIRED HYPOTHYROIDISM: ICD-10-CM

## 2021-09-14 DIAGNOSIS — Z00.00 ROUTINE GENERAL MEDICAL EXAMINATION AT A HEALTH CARE FACILITY: Primary | ICD-10-CM

## 2021-09-14 DIAGNOSIS — E78.2 MIXED HYPERLIPIDEMIA: ICD-10-CM

## 2021-09-14 LAB
ALBUMIN SERPL-MCNC: 3.8 G/DL (ref 3.4–5)
ALP SERPL-CCNC: 53 U/L (ref 40–150)
ALT SERPL W P-5'-P-CCNC: 25 U/L (ref 0–50)
ANION GAP SERPL CALCULATED.3IONS-SCNC: 6 MMOL/L (ref 3–14)
AST SERPL W P-5'-P-CCNC: 17 U/L (ref 0–45)
BILIRUB SERPL-MCNC: 0.8 MG/DL (ref 0.2–1.3)
BUN SERPL-MCNC: 12 MG/DL (ref 7–30)
CALCIUM SERPL-MCNC: 8.2 MG/DL (ref 8.5–10.1)
CHLORIDE BLD-SCNC: 106 MMOL/L (ref 94–109)
CHOLEST SERPL-MCNC: 178 MG/DL
CO2 SERPL-SCNC: 24 MMOL/L (ref 20–32)
CREAT SERPL-MCNC: 0.99 MG/DL (ref 0.52–1.04)
FASTING STATUS PATIENT QL REPORTED: YES
GFR SERPL CREATININE-BSD FRML MDRD: 73 ML/MIN/1.73M2
GLUCOSE BLD-MCNC: 103 MG/DL (ref 70–99)
HDLC SERPL-MCNC: 38 MG/DL
LDLC SERPL CALC-MCNC: 96 MG/DL
NONHDLC SERPL-MCNC: 140 MG/DL
POTASSIUM BLD-SCNC: 3.7 MMOL/L (ref 3.4–5.3)
PROT SERPL-MCNC: 7.9 G/DL (ref 6.8–8.8)
SODIUM SERPL-SCNC: 136 MMOL/L (ref 133–144)
TRIGL SERPL-MCNC: 220 MG/DL
TSH SERPL DL<=0.005 MIU/L-ACNC: 2.15 MU/L (ref 0.4–4)

## 2021-09-14 PROCEDURE — 84443 ASSAY THYROID STIM HORMONE: CPT | Performed by: PHYSICIAN ASSISTANT

## 2021-09-14 PROCEDURE — 36415 COLL VENOUS BLD VENIPUNCTURE: CPT | Performed by: PHYSICIAN ASSISTANT

## 2021-09-14 PROCEDURE — 80061 LIPID PANEL: CPT | Performed by: PHYSICIAN ASSISTANT

## 2021-09-14 PROCEDURE — 80053 COMPREHEN METABOLIC PANEL: CPT | Performed by: PHYSICIAN ASSISTANT

## 2021-09-14 PROCEDURE — 99395 PREV VISIT EST AGE 18-39: CPT | Performed by: PHYSICIAN ASSISTANT

## 2021-09-14 PROCEDURE — 99214 OFFICE O/P EST MOD 30 MIN: CPT | Mod: 25 | Performed by: PHYSICIAN ASSISTANT

## 2021-09-14 ASSESSMENT — ENCOUNTER SYMPTOMS
SORE THROAT: 0
NERVOUS/ANXIOUS: 0
COUGH: 0
CHILLS: 0
EYE PAIN: 0
FREQUENCY: 0
SHORTNESS OF BREATH: 0
HEMATOCHEZIA: 0
HEMATURIA: 0
DIZZINESS: 0
PARESTHESIAS: 0
WEAKNESS: 0
CONSTIPATION: 0
HEARTBURN: 0
DYSURIA: 0
PALPITATIONS: 0
ARTHRALGIAS: 0
BREAST MASS: 0
HEADACHES: 0
DIARRHEA: 0
MYALGIAS: 0
ABDOMINAL PAIN: 0
NAUSEA: 0
FEVER: 0
JOINT SWELLING: 0

## 2021-09-14 ASSESSMENT — PAIN SCALES - GENERAL: PAINLEVEL: NO PAIN (0)

## 2021-09-14 ASSESSMENT — MIFFLIN-ST. JEOR: SCORE: 1562.45

## 2021-09-14 NOTE — PROGRESS NOTES
SUBJECTIVE:   CC: Princess Bonner is an 36 year old woman who presents for preventive health visit.       Patient has been advised of split billing requirements and indicates understanding: Yes  Healthy Habits:     Getting at least 3 servings of Calcium per day:  Yes    Bi-annual eye exam:  NO    Dental care twice a year:  Yes    Sleep apnea or symptoms of sleep apnea:  None    Diet:  Regular (no restrictions) and Breakfast skipped    Frequency of exercise:  4-5 days/week    Duration of exercise:  45-60 minutes    Taking medications regularly:  Yes    Barriers to taking medications:  None    Medication side effects:  None    PHQ-2 Total Score: 0    Additional concerns today:  No            Today's PHQ-2 Score:   PHQ-2 ( 1999 Pfizer) 9/9/2021   Q1: Little interest or pleasure in doing things 0   Q2: Feeling down, depressed or hopeless 0   PHQ-2 Score 0   Q1: Little interest or pleasure in doing things Not at all   Q2: Feeling down, depressed or hopeless Not at all   PHQ-2 Score 0       Abuse: Current or Past (Physical, Sexual or Emotional) - No  Do you feel safe in your environment? Yes    Have you ever done Advance Care Planning? (For example, a Health Directive, POLST, or a discussion with a medical provider or your loved ones about your wishes): No, advance care planning information given to patient to review.  Patient declined advance care planning discussion at this time.    Social History     Tobacco Use     Smoking status: Never Smoker     Smokeless tobacco: Never Used   Substance Use Topics     Alcohol use: Yes     Comment: Occ     If you drink alcohol do you typically have >3 drinks per day or >7 drinks per week? No    Alcohol Use 9/14/2021   Prescreen: >3 drinks/day or >7 drinks/week? -   Prescreen: >3 drinks/day or >7 drinks/week? No       Reviewed orders with patient.  Reviewed health maintenance and updated orders accordingly - Yes  Patient Active Problem List   Diagnosis     Cervical cancer screening      Acquired hypothyroidism     Mixed hyperlipidemia     Cervical high risk HPV (human papillomavirus) test positive     Cervical dysplasia, mild     Past Surgical History:   Procedure Laterality Date     CRYOTHERAPY, CERVICAL  2008     wisdom teeth removed      first surgery was 2010, had bottom wosdom teeth taken out in August 2018       Social History     Tobacco Use     Smoking status: Never Smoker     Smokeless tobacco: Never Used   Substance Use Topics     Alcohol use: Yes     Comment: Occ     Family History   Problem Relation Age of Onset     No Known Problems Mother      No Known Problems Father      No Known Problems Brother      No Known Problems Sister      No Known Problems Maternal Grandmother      No Known Problems Maternal Grandfather      No Known Problems Paternal Grandmother          Current Outpatient Medications   Medication Sig Dispense Refill     atorvastatin (LIPITOR) 20 MG tablet Take 1 tablet (20 mg) by mouth daily 30 tablet 0     levothyroxine (SYNTHROID/LEVOTHROID) 75 MCG tablet Take 1 tablet (75 mcg) by mouth daily 90 tablet 1     Multiple Vitamins-Minerals (MULTIVITAMIN PO)        No Known Allergies    Breast Cancer Screening:    Breast CA Risk Assessment (FHS-7) 9/9/2021   Do you have a family history of breast, colon, or ovarian cancer? No / Unknown         Patient under 40 years of age: Routine Mammogram Screening not recommended.   Pertinent mammograms are reviewed under the imaging tab.    History of abnormal Pap smear: YES - updated in Problem List and Health Maintenance accordingly  PAP / HPV Latest Ref Rng & Units 10/5/2020 10/24/2019 10/31/2018   PAP (Historical) - NIL NIL NIL   HPV16 NEG:Negative Negative Negative Positive(A)   HPV18 NEG:Negative Negative Negative Negative   HRHPV NEG:Negative Negative Negative Negative     Reviewed and updated as needed this visit by clinical staff  Tobacco  Allergies  Meds   Med Hx  Surg Hx  Fam Hx  Soc Hx        Reviewed and updated as  "needed this visit by Provider  Tobacco     Med Hx  Surg Hx  Fam Hx  Soc Hx           Review of Systems   Constitutional: Negative for chills and fever.   HENT: Negative for congestion, ear pain, hearing loss and sore throat.    Eyes: Negative for pain and visual disturbance.   Respiratory: Negative for cough and shortness of breath.    Cardiovascular: Negative for chest pain, palpitations and peripheral edema.   Gastrointestinal: Negative for abdominal pain, constipation, diarrhea, heartburn, hematochezia and nausea.   Breasts:  Negative for tenderness, breast mass and discharge.   Genitourinary: Negative for dysuria, frequency, genital sores, hematuria, pelvic pain, urgency, vaginal bleeding and vaginal discharge.   Musculoskeletal: Negative for arthralgias, joint swelling and myalgias.   Skin: Negative for rash.   Neurological: Negative for dizziness, weakness, headaches and paresthesias.   Psychiatric/Behavioral: Negative for mood changes. The patient is not nervous/anxious.           OBJECTIVE:   /80   Pulse 74   Temp 97.8  F (36.6  C)   Resp 16   Ht 1.689 m (5' 6.5\")   Wt 85.3 kg (188 lb)   SpO2 94%   BMI 29.89 kg/m    Physical Exam  Constitutional:       General: She is not in acute distress.     Appearance: She is well-developed.   HENT:      Right Ear: Tympanic membrane and external ear normal.      Left Ear: Tympanic membrane and external ear normal.   Eyes:      General:         Right eye: No discharge.         Left eye: No discharge.      Conjunctiva/sclera: Conjunctivae normal.      Pupils: Pupils are equal, round, and reactive to light.   Neck:      Thyroid: No thyromegaly.      Trachea: No tracheal deviation.   Cardiovascular:      Rate and Rhythm: Normal rate and regular rhythm.      Pulses: Normal pulses.      Heart sounds: Normal heart sounds, S1 normal and S2 normal. No murmur heard.   No friction rub. No S3 or S4 sounds.    Pulmonary:      Effort: Pulmonary effort is normal. No " respiratory distress.      Breath sounds: Normal breath sounds. No wheezing or rales.   Chest:      Breasts:         Right: No mass, nipple discharge or tenderness.         Left: No mass, nipple discharge or tenderness.   Abdominal:      Palpations: Abdomen is soft. There is no mass.      Tenderness: There is no abdominal tenderness.   Musculoskeletal:         General: Normal range of motion.      Cervical back: Neck supple.   Lymphadenopathy:      Cervical: No cervical adenopathy.   Skin:     General: Skin is warm and dry.      Findings: No rash.   Neurological:      Mental Status: She is alert and oriented to person, place, and time.      Motor: No abnormal muscle tone.   Psychiatric:         Thought Content: Thought content normal.         Judgment: Judgment normal.           Diagnostic Test Results:  Labs reviewed in Epic    ASSESSMENT/PLAN:       ICD-10-CM    1. Routine general medical examination at a health care facility  Z00.00 Comprehensive metabolic panel (BMP + Alb, Alk Phos, ALT, AST, Total. Bili, TP)     SKIN CARE REFERRAL   2. Acquired hypothyroidism  E03.9 TSH with free T4 reflex   3. Mixed hyperlipidemia  E78.2 Lipid panel reflex to direct LDL Fasting     Comprehensive metabolic panel (BMP + Alb, Alk Phos, ALT, AST, Total. Bili, TP)      - preventive care as above  - FH pancreatic cancer in maternal uncle and maternal aunt - mother has been screened - neg.  Patient will not get screening unless mother has evidence of pancreatic cancer.  I am comfortable with this because mother is up to date on pancreatic cancer screening.   - Labs updated for thyroid and lipids - refills to be sent after labs are completed.     Patient has been advised of split billing requirements and indicates understanding: Yes  COUNSELING:  Reviewed preventive health counseling, as reflected in patient instructions    Estimated body mass index is 29.89 kg/m  as calculated from the following:    Height as of this encounter: 1.689  "m (5' 6.5\").    Weight as of this encounter: 85.3 kg (188 lb).    Weight management plan: Discussed healthy diet and exercise guidelines    She reports that she has never smoked. She has never used smokeless tobacco.      Counseling Resources:  ATP IV Guidelines  Pooled Cohorts Equation Calculator  Breast Cancer Risk Calculator  BRCA-Related Cancer Risk Assessment: FHS-7 Tool  FRAX Risk Assessment  ICSI Preventive Guidelines  Dietary Guidelines for Americans, 2010  USDA's MyPlate  ASA Prophylaxis  Lung CA Screening    MARLINE Judge [facility]  "

## 2021-09-21 DIAGNOSIS — E03.9 ACQUIRED HYPOTHYROIDISM: ICD-10-CM

## 2021-09-21 DIAGNOSIS — E78.1 PURE HYPERTRIGLYCERIDEMIA: ICD-10-CM

## 2021-09-21 RX ORDER — LEVOTHYROXINE SODIUM 75 UG/1
75 TABLET ORAL DAILY
Qty: 90 TABLET | Refills: 3 | Status: SHIPPED | OUTPATIENT
Start: 2021-09-21 | End: 2022-09-22

## 2021-09-21 RX ORDER — ATORVASTATIN CALCIUM 20 MG/1
20 TABLET, FILM COATED ORAL DAILY
Qty: 90 TABLET | Refills: 3 | Status: SHIPPED | OUTPATIENT
Start: 2021-09-21 | End: 2022-12-19

## 2021-09-21 NOTE — RESULT ENCOUNTER NOTE
"[place]    Your lab tests are complete and I have reviewed the results.     Lipids (cholesterol) look ok - we will recheck again next year.  No need to change your atorvastatin dose, however make sure you keep taking it.  I sent refills to your pharmacy.   - Your glucose (screening for diabetes) was slightly high (100-125), in the range of \"pre-diabetes\".  Try to decrease sugars and carbohydrates from your diet and increase exercise to keep those numbers down.  We'll recheck next year.  - Your TSH was normal, indicating that you are on the correct dose of thyroid medication.  I sent additional refills of your current medication dose to your pharmacy.    If you have any questions or concerns, please feel free to call or send a [employer] message.    Sincerely,  Jose D Mccain PA-C  "

## 2022-09-18 ENCOUNTER — HEALTH MAINTENANCE LETTER (OUTPATIENT)
Age: 37
End: 2022-09-18

## 2022-09-20 DIAGNOSIS — E03.9 ACQUIRED HYPOTHYROIDISM: ICD-10-CM

## 2022-09-22 RX ORDER — LEVOTHYROXINE SODIUM 75 UG/1
75 TABLET ORAL DAILY
Qty: 90 TABLET | Refills: 0 | Status: SHIPPED | OUTPATIENT
Start: 2022-09-22 | End: 2022-12-19

## 2022-09-22 NOTE — TELEPHONE ENCOUNTER
Medication is being filled for 1 time refill only due to:  Patient needs to be seen because it has been more than one year since last visit.    Pt has appt scheduled  Kyle GAYLE RN, BSN

## 2022-10-27 ASSESSMENT — ENCOUNTER SYMPTOMS
MYALGIAS: 0
HEADACHES: 0
ARTHRALGIAS: 0
CONSTIPATION: 0
EYE PAIN: 0
NERVOUS/ANXIOUS: 0
FREQUENCY: 0
FEVER: 0
HEMATOCHEZIA: 0
COUGH: 1
WEAKNESS: 0
NAUSEA: 0
BREAST MASS: 0
ABDOMINAL PAIN: 0
SORE THROAT: 0
CHILLS: 0
PALPITATIONS: 0
DIARRHEA: 0
PARESTHESIAS: 0
JOINT SWELLING: 0
SHORTNESS OF BREATH: 0
DIZZINESS: 0
HEARTBURN: 0
HEMATURIA: 0
DYSURIA: 0

## 2022-10-28 ENCOUNTER — OFFICE VISIT (OUTPATIENT)
Dept: FAMILY MEDICINE | Facility: CLINIC | Age: 37
End: 2022-10-28
Payer: COMMERCIAL

## 2022-10-28 VITALS
OXYGEN SATURATION: 96 % | SYSTOLIC BLOOD PRESSURE: 128 MMHG | BODY MASS INDEX: 29.44 KG/M2 | HEIGHT: 67 IN | DIASTOLIC BLOOD PRESSURE: 84 MMHG | TEMPERATURE: 97.9 F | HEART RATE: 74 BPM

## 2022-10-28 DIAGNOSIS — J98.01 COLD INDUCED BRONCHOSPASM: ICD-10-CM

## 2022-10-28 DIAGNOSIS — Z00.00 ROUTINE GENERAL MEDICAL EXAMINATION AT A HEALTH CARE FACILITY: Primary | ICD-10-CM

## 2022-10-28 DIAGNOSIS — E78.2 MIXED HYPERLIPIDEMIA: ICD-10-CM

## 2022-10-28 DIAGNOSIS — E03.9 ACQUIRED HYPOTHYROIDISM: ICD-10-CM

## 2022-10-28 DIAGNOSIS — J30.2 SEASONAL ALLERGIC RHINITIS, UNSPECIFIED TRIGGER: ICD-10-CM

## 2022-10-28 DIAGNOSIS — Z13.21 ENCOUNTER FOR VITAMIN DEFICIENCY SCREENING: ICD-10-CM

## 2022-10-28 DIAGNOSIS — Z87.09 HISTORY OF ASTHMA: ICD-10-CM

## 2022-10-28 PROBLEM — E60 CHRONIC ZINC DEFICIENCY: Status: ACTIVE | Noted: 2021-09-18

## 2022-10-28 PROBLEM — F41.9 ANXIETY: Status: ACTIVE | Noted: 2021-10-08

## 2022-10-28 LAB
ERYTHROCYTE [DISTWIDTH] IN BLOOD BY AUTOMATED COUNT: 13.2 % (ref 10–15)
HCT VFR BLD AUTO: 41 % (ref 35–47)
HGB BLD-MCNC: 13.8 G/DL (ref 11.7–15.7)
MCH RBC QN AUTO: 30.1 PG (ref 26.5–33)
MCHC RBC AUTO-ENTMCNC: 33.7 G/DL (ref 31.5–36.5)
MCV RBC AUTO: 90 FL (ref 78–100)
PLATELET # BLD AUTO: 241 10E3/UL (ref 150–450)
RBC # BLD AUTO: 4.58 10E6/UL (ref 3.8–5.2)
WBC # BLD AUTO: 6.3 10E3/UL (ref 4–11)

## 2022-10-28 PROCEDURE — 36415 COLL VENOUS BLD VENIPUNCTURE: CPT | Performed by: INTERNAL MEDICINE

## 2022-10-28 PROCEDURE — 85027 COMPLETE CBC AUTOMATED: CPT | Performed by: INTERNAL MEDICINE

## 2022-10-28 PROCEDURE — 99395 PREV VISIT EST AGE 18-39: CPT | Performed by: INTERNAL MEDICINE

## 2022-10-28 PROCEDURE — 80061 LIPID PANEL: CPT | Performed by: INTERNAL MEDICINE

## 2022-10-28 PROCEDURE — 82306 VITAMIN D 25 HYDROXY: CPT | Performed by: INTERNAL MEDICINE

## 2022-10-28 PROCEDURE — 84443 ASSAY THYROID STIM HORMONE: CPT | Performed by: INTERNAL MEDICINE

## 2022-10-28 PROCEDURE — 80053 COMPREHEN METABOLIC PANEL: CPT | Performed by: INTERNAL MEDICINE

## 2022-10-28 PROCEDURE — 99214 OFFICE O/P EST MOD 30 MIN: CPT | Mod: 25 | Performed by: INTERNAL MEDICINE

## 2022-10-28 RX ORDER — AZITHROMYCIN 250 MG/1
TABLET, FILM COATED ORAL
Qty: 6 TABLET | Refills: 0 | Status: SHIPPED | OUTPATIENT
Start: 2022-10-28 | End: 2023-05-10

## 2022-10-28 RX ORDER — ALBUTEROL SULFATE 90 UG/1
2 AEROSOL, METERED RESPIRATORY (INHALATION) EVERY 6 HOURS
Qty: 18 G | Refills: 1 | Status: SHIPPED | OUTPATIENT
Start: 2022-10-28

## 2022-10-28 ASSESSMENT — ENCOUNTER SYMPTOMS
SHORTNESS OF BREATH: 0
PALPITATIONS: 0
JOINT SWELLING: 0
HEADACHES: 0
HEARTBURN: 0
HEMATOCHEZIA: 0
FREQUENCY: 0
EYE PAIN: 0
DYSURIA: 0
DIZZINESS: 0
CHILLS: 0
SORE THROAT: 0
COUGH: 1
NERVOUS/ANXIOUS: 0
HEMATURIA: 0
WEAKNESS: 0
DIARRHEA: 0
FEVER: 0
NAUSEA: 0
ARTHRALGIAS: 0
PARESTHESIAS: 0
MYALGIAS: 0
CONSTIPATION: 0
BREAST MASS: 0
ABDOMINAL PAIN: 0

## 2022-10-28 ASSESSMENT — PAIN SCALES - GENERAL: PAINLEVEL: NO PAIN (0)

## 2022-10-28 NOTE — PATIENT INSTRUCTIONS
As discussed , please do fasting labs    ======================    Preventive Health Recommendations  Female Ages 26 - 39  Yearly exam:   See your health care provider every year in order to  Review health changes.   Discuss preventive care.    Review your medicines if you your doctor has prescribed any.    Until age 30: Get a Pap test every three years (more often if you have had an abnormal result).    After age 30: Talk to your doctor about whether you should have a Pap test every 3 years or have a Pap test with HPV screening every 5 years.   You do not need a Pap test if your uterus was removed (hysterectomy) and you have not had cancer.  You should be tested each year for STDs (sexually transmitted diseases), if you're at risk.   Talk to your provider about how often to have your cholesterol checked.  If you are at risk for diabetes, you should have a diabetes test (fasting glucose).  Shots: Get a flu shot each year. Get a tetanus shot every 10 years.   Nutrition:   Eat at least 5 servings of fruits and vegetables each day.  Eat whole-grain bread, whole-wheat pasta and brown rice instead of white grains and rice.  Get adequate Calcium and Vitamin D.     Lifestyle  Exercise at least 150 minutes a week (30 minutes a day, 5 days of the week). This will help you control your weight and prevent disease.  Limit alcohol to one drink per day.  No smoking.   Wear sunscreen to prevent skin cancer.  See your dentist every six months for an exam and cleaning.

## 2022-10-28 NOTE — PROGRESS NOTES
SUBJECTIVE:   CC: Princess is an 37 year old who presents for preventive health visit.     Patient has been advised of split billing requirements and indicates understanding: Yes  Healthy Habits:     Getting at least 3 servings of Calcium per day:  Yes    Bi-annual eye exam:  NO    Dental care twice a year:  Yes    Sleep apnea or symptoms of sleep apnea:  None    Diet:  Gluten-free/reduced    Frequency of exercise:  6-7 days/week    Duration of exercise:  45-60 minutes    Taking medications regularly:  Yes    Medication side effects:  Not applicable    PHQ-2 Total Score: 1    Additional concerns today:  No      Today's PHQ-2 Score:   PHQ-2 ( 1999 Pfizer) 10/27/2022   Q1: Little interest or pleasure in doing things 1   Q2: Feeling down, depressed or hopeless 0   PHQ-2 Score 1   PHQ-2 Total Score (12-17 Years)- Positive if 3 or more points; Administer PHQ-A if positive -   Q1: Little interest or pleasure in doing things Several days   Q2: Feeling down, depressed or hopeless Not at all   PHQ-2 Score 1       Abuse: Current or Past (Physical, Sexual or Emotional) - No  Do you feel safe in your environment? Yes        Social History     Tobacco Use     Smoking status: Never     Smokeless tobacco: Never   Substance Use Topics     Alcohol use: Yes     Comment: Occ     If you drink alcohol do you typically have >3 drinks per day or >7 drinks per week? Yes    No flowsheet data found.    Reviewed orders with patient.  Reviewed health maintenance and updated orders accordingly - Yes  Lab work is in process  Labs reviewed in EPIC    Breast Cancer Screening:    Breast CA Risk Assessment (FHS-7) 9/9/2021   Do you have a family history of breast, colon, or ovarian cancer? No / Unknown       click delete button to remove this line now  Patient under 40 years of age: Routine Mammogram Screening not recommended.   Pertinent mammograms are reviewed under the imaging tab.    History of abnormal Pap smear: NO - age 30- 65 PAP every 3  years recommended  PAP / HPV Latest Ref Rng & Units 10/5/2020 10/24/2019 10/31/2018   PAP (Historical) - NIL NIL NIL   HPV16 NEG:Negative Negative Negative Positive(A)   HPV18 NEG:Negative Negative Negative Negative   HRHPV NEG:Negative Negative Negative Negative     Reviewed and updated as needed this visit by clinical staff   Tobacco  Allergies  Meds  Problems  Med Hx  Surg Hx  Fam Hx          Reviewed and updated as needed this visit by Provider   Tobacco  Allergies  Meds  Problems  Med Hx  Surg Hx  Fam Hx         Past Medical History:   Diagnosis Date     Cervical dysplasia, mild 2018     Cervical high risk HPV (human papillomavirus) test positive 10/31/2018    10/31/18:See problem list.      Thyroid disease      Uncomplicated asthma 12/15      Past Surgical History:   Procedure Laterality Date     CRYOTHERAPY, CERVICAL  2008     wisdom teeth removed      first surgery was , had bottom wosdom teeth taken out in 2018     OB History    Para Term  AB Living   0 0 0 0 0 0   SAB IAB Ectopic Multiple Live Births   0 0 0 0 0       Review of Systems   Constitutional: Negative for chills and fever.   HENT: Positive for congestion. Negative for ear pain, hearing loss and sore throat.    Eyes: Negative for pain and visual disturbance.   Respiratory: Positive for cough. Negative for shortness of breath.    Cardiovascular: Negative for chest pain, palpitations and peripheral edema.   Gastrointestinal: Negative for abdominal pain, constipation, diarrhea, heartburn, hematochezia and nausea.   Breasts:  Negative for tenderness, breast mass and discharge.   Genitourinary: Negative for dysuria, frequency, genital sores, hematuria, pelvic pain, urgency, vaginal bleeding and vaginal discharge.   Musculoskeletal: Negative for arthralgias, joint swelling and myalgias.   Skin: Negative for rash.   Neurological: Negative for dizziness, weakness, headaches and paresthesias.  "  Psychiatric/Behavioral: Negative for mood changes. The patient is not nervous/anxious.      CONSTITUTIONAL: NEGATIVE for fever, chills, change in weight  INTEGUMENTARU/SKIN: NEGATIVE for worrisome rashes, moles or lesions  EYES: NEGATIVE for vision changes or irritation  ENT: NEGATIVE for ear, mouth and throat problems  RESP: NEGATIVE for significant cough or SOB  BREAST: NEGATIVE for masses, tenderness or discharge  CV: NEGATIVE for chest pain, palpitations or peripheral edema  GI: NEGATIVE for nausea, abdominal pain, heartburn, or change in bowel habits  : NEGATIVE for unusual urinary or vaginal symptoms. Periods are regular.  MUSCULOSKELETAL: NEGATIVE for significant arthralgias or myalgia  NEURO: NEGATIVE for weakness, dizziness or paresthesias  PSYCHIATRIC: NEGATIVE for changes in mood or affect     OBJECTIVE:   /84   Pulse 74   Temp 97.9  F (36.6  C) (Temporal)   Ht 1.702 m (5' 7\")   SpO2 96%   BMI 29.44 kg/m    Physical Exam  GENERAL: healthy, alert and no distress  EYES: Eyes grossly normal to inspection, PERRL and conjunctivae and sclerae normal  HENT: ear canals and TM's normal, nose and mouth without ulcers or lesions , positive mild sinus congestion and warmth on face   NECK: no adenopathy, no asymmetry, masses, or scars and thyroid normal to palpation  RESP: lungs clear to auscultation - no rales, rhonchi or wheezes  BREAST: normal without masses, tenderness or nipple discharge and no palpable axillary masses or adenopathy  CV: regular rate and rhythm, normal S1 S2, no S3 or S4, no murmur, click or rub, no peripheral edema and peripheral pulses strong  ABDOMEN: soft, nontender, no hepatosplenomegaly, no masses and bowel sounds normal  MS: no gross musculoskeletal defects noted, no edema  SKIN: no suspicious lesions or rashes  NEURO: Normal strength and tone, mentation intact and speech normal  PSYCH: mentation appears normal, affect normal/bright    Diagnostic Test Results:  Labs reviewed " in Epic    ASSESSMENT/PLAN:     Assessment and Plan  1. Routine general medical examination at a health care facility  Pt is new to me, last seen on 9/2021 . Does have HLD and Hypothyroidism with last labs in 9/2021showing normal CMP, dyslipidmia . On lipitor and LT 75 mcg daily. She is here for acute concerns in addition of URI  - REVIEW OF HEALTH MAINTENANCE PROTOCOL ORDERS  - Comprehensive metabolic panel (BMP + Alb, Alk Phos, ALT, AST, Total. Bili, TP); Future  - CBC with platelets; Future  - TSH with free T4 reflex; Future  - Lipid panel reflex to direct LDL Fasting; Future  - Comprehensive metabolic panel (BMP + Alb, Alk Phos, ALT, AST, Total. Bili, TP)  - CBC with platelets  - TSH with free T4 reflex  - Lipid panel reflex to direct LDL Fasting    2. Acquired hypothyroidism  - TSH with free T4 reflex; Future  - TSH with free T4 reflex    3. Mixed hyperlipidemia  Uncontrolled, inspite of current Lipitor. Have added Fish oil capsules for improvement.   - Comprehensive metabolic panel (BMP + Alb, Alk Phos, ALT, AST, Total. Bili, TP); Future  - Lipid panel reflex to direct LDL Fasting; Future  - Comprehensive metabolic panel (BMP + Alb, Alk Phos, ALT, AST, Total. Bili, TP)  - Lipid panel reflex to direct LDL Fasting  - Omega-3 Fish Oil 500 MG capsule; Take 1 capsule (500 mg) by mouth daily  Dispense: 90 capsule; Refill: 1    4. Encounter for vitamin deficiency screening  - Vitamin D deficiency screening; Future  - Vitamin D deficiency screening    5. Seasonal allergic rhinitis, unspecified trigger  6. Cold induced bronchospasm  7. History of asthma  Acute problem, of ongoing mild productive cough , exertional dyspnea with past history of using Albuterol in the past . Physical exam positive mild sinus congestion and warmth on face but normal lungs at this time.  Will treat this as overlap of seasonal rhinitis with post nasal drip and give coverage of antibiotic and as needed Albuterol for improvement. Conservative  measures explained. Pt understood and agreed with the plan.   - CBC with platelets; Future  - albuterol (PROAIR HFA/PROVENTIL HFA/VENTOLIN HFA) 108 (90 Base) MCG/ACT inhaler; Inhale 2 puffs into the lungs every 6 hours  Dispense: 18 g; Refill: 1  - azithromycin (ZITHROMAX) 250 MG tablet; Two tablets first day, then one tablet daily for four days.  Dispense: 6 tablet; Refill: 0  - CBC with platelets       Patient Instructions   As discussed , please do fasting labs    ======================    Preventive Health Recommendations  Female Ages 26 - 39  Yearly exam:   See your health care provider every year in order to    Review health changes.     Discuss preventive care.      Review your medicines if you your doctor has prescribed any.    Until age 30: Get a Pap test every three years (more often if you have had an abnormal result).    After age 30: Talk to your doctor about whether you should have a Pap test every 3 years or have a Pap test with HPV screening every 5 years.   You do not need a Pap test if your uterus was removed (hysterectomy) and you have not had cancer.  You should be tested each year for STDs (sexually transmitted diseases), if you're at risk.   Talk to your provider about how often to have your cholesterol checked.  If you are at risk for diabetes, you should have a diabetes test (fasting glucose).  Shots: Get a flu shot each year. Get a tetanus shot every 10 years.   Nutrition:     Eat at least 5 servings of fruits and vegetables each day.    Eat whole-grain bread, whole-wheat pasta and brown rice instead of white grains and rice.    Get adequate Calcium and Vitamin D.     Lifestyle    Exercise at least 150 minutes a week (30 minutes a day, 5 days of the week). This will help you control your weight and prevent disease.    Limit alcohol to one drink per day.    No smoking.     Wear sunscreen to prevent skin cancer.    See your dentist every six months for an exam and cleaning.      Return in  "about 1 year (around 10/28/2023), or if symptoms worsen or fail to improve, for Preventative Visit.    Isabel Morales MD  [facility]    Patient has been advised of split billing requirements and indicates understanding: Yes      COUNSELING:  Reviewed preventive health counseling, as reflected in patient instructions  Special attention given to:        Regular exercise       Healthy diet/nutrition       Vision screening       Hearing screening       Contraception    Estimated body mass index is 29.44 kg/m  as calculated from the following:    Height as of this encounter: 1.702 m (5' 7\").    Weight as of 9/14/21: 85.3 kg (188 lb).    Weight management plan: Discussed healthy diet and exercise guidelines    She reports that she has never smoked. She has never used smokeless tobacco.      Counseling Resources:  ATP IV Guidelines  Pooled Cohorts Equation Calculator  Breast Cancer Risk Calculator  BRCA-Related Cancer Risk Assessment: FHS-7 Tool  FRAX Risk Assessment  ICSI Preventive Guidelines  Dietary Guidelines for Americans, 2010  USDA's MyPlate  ASA Prophylaxis  Lung CA Screening    Isabel Morales MD  [facility] [place]  "

## 2022-10-29 LAB
ALBUMIN SERPL-MCNC: 4.3 G/DL (ref 3.4–5)
ALP SERPL-CCNC: 57 U/L (ref 40–150)
ALT SERPL W P-5'-P-CCNC: 33 U/L (ref 0–50)
ANION GAP SERPL CALCULATED.3IONS-SCNC: 9 MMOL/L (ref 3–14)
AST SERPL W P-5'-P-CCNC: 24 U/L (ref 0–45)
BILIRUB SERPL-MCNC: 2.1 MG/DL (ref 0.2–1.3)
BUN SERPL-MCNC: 12 MG/DL (ref 7–30)
CALCIUM SERPL-MCNC: 9.4 MG/DL (ref 8.5–10.1)
CHLORIDE BLD-SCNC: 106 MMOL/L (ref 94–109)
CHOLEST SERPL-MCNC: 163 MG/DL
CO2 SERPL-SCNC: 23 MMOL/L (ref 20–32)
CREAT SERPL-MCNC: 0.8 MG/DL (ref 0.52–1.04)
FASTING STATUS PATIENT QL REPORTED: YES
GFR SERPL CREATININE-BSD FRML MDRD: >90 ML/MIN/1.73M2
GLUCOSE BLD-MCNC: 106 MG/DL (ref 70–99)
HDLC SERPL-MCNC: 43 MG/DL
LDLC SERPL CALC-MCNC: 82 MG/DL
NONHDLC SERPL-MCNC: 120 MG/DL
POTASSIUM BLD-SCNC: 3.8 MMOL/L (ref 3.4–5.3)
PROT SERPL-MCNC: 7.9 G/DL (ref 6.8–8.8)
SODIUM SERPL-SCNC: 138 MMOL/L (ref 133–144)
TRIGL SERPL-MCNC: 190 MG/DL
TSH SERPL DL<=0.005 MIU/L-ACNC: 2.06 MU/L (ref 0.4–4)

## 2022-10-30 RX ORDER — METAPROTERENOL SULFATE 10 MG
500 TABLET ORAL DAILY
Qty: 90 CAPSULE | Refills: 1 | Status: SHIPPED | OUTPATIENT
Start: 2022-10-30

## 2022-10-31 LAB — DEPRECATED CALCIDIOL+CALCIFEROL SERPL-MC: 35 UG/L (ref 20–75)

## 2022-12-18 DIAGNOSIS — E78.1 PURE HYPERTRIGLYCERIDEMIA: ICD-10-CM

## 2022-12-18 DIAGNOSIS — E03.9 ACQUIRED HYPOTHYROIDISM: ICD-10-CM

## 2022-12-19 RX ORDER — ATORVASTATIN CALCIUM 20 MG/1
TABLET, FILM COATED ORAL
Qty: 90 TABLET | Refills: 2 | Status: SHIPPED | OUTPATIENT
Start: 2022-12-19 | End: 2023-06-19

## 2022-12-19 RX ORDER — LEVOTHYROXINE SODIUM 75 UG/1
TABLET ORAL
Qty: 90 TABLET | Refills: 2 | Status: SHIPPED | OUTPATIENT
Start: 2022-12-19 | End: 2023-06-19

## 2023-01-04 ENCOUNTER — MYC MEDICAL ADVICE (OUTPATIENT)
Dept: FAMILY MEDICINE | Facility: CLINIC | Age: 38
End: 2023-01-04

## 2023-01-06 ENCOUNTER — E-VISIT (OUTPATIENT)
Dept: FAMILY MEDICINE | Facility: CLINIC | Age: 38
End: 2023-01-06
Payer: COMMERCIAL

## 2023-01-06 DIAGNOSIS — Z30.09 ENCOUNTER FOR COUNSELING REGARDING CONTRACEPTION: Primary | ICD-10-CM

## 2023-01-06 PROCEDURE — 99422 OL DIG E/M SVC 11-20 MIN: CPT | Performed by: INTERNAL MEDICINE

## 2023-01-06 RX ORDER — NORETHINDRONE ACETATE AND ETHINYL ESTRADIOL .02; 1 MG/1; MG/1
1 TABLET ORAL DAILY
Qty: 84 TABLET | Refills: 2 | Status: SHIPPED | OUTPATIENT
Start: 2023-01-06 | End: 2023-09-28

## 2023-01-06 NOTE — PATIENT INSTRUCTIONS
Thank you for choosing us for your care. I have placed an order for a prescription so that you can start treatment. View your full visit summary for details by clicking on the link below. Your pharmacist will able to address any questions you may have about the medication.     If you're not feeling better within 5-7 days, please schedule an appointment.  You can schedule an appointment right here in [facility], or call [phone]  If the visit is for the same symptoms as your eVisit, we'll refund the cost of your eVisit if seen within seven days.

## 2023-01-06 NOTE — TELEPHONE ENCOUNTER
Saw her in 10/2022 with no discussion on OCPs during her OV.   Given her age of 37, I will need to run through the complications of OCPs at her age group before sending the prescription. OK to submit E-visit so that I can mention the same and send the OCPs as requested.

## 2023-01-06 NOTE — TELEPHONE ENCOUNTER
Provider E-Visit time total (minutes): 15 minutes      Sent in [id number] message as below =     Ghassan Sánchez ,     I definitely understand your concern and I am happy to prescribe the oral contraceptive pills you want to get restarted on.  Just also let you know that you do have other options including IUD, Nexplanon which she can plan as per your convenience her options.    The only thing which I want to emphasize is you will need to make sure for the birth control pills you are using that you do not have any family history of breast cancersor uterine cancers which will increase your risk of getting them if you are on hormone pills.  Also make sure you do not have any personal history of DVTs or blood clots in the past which could aggravate the risk by using hormonal pills. Please update me if you have any of these things positive for alternative recommendations.      Sent in the medication to your pharmacy,    Thank you,  Isabel Morales MD on 1/6/2023 at 3:49 PM

## 2023-02-21 ENCOUNTER — OFFICE VISIT (OUTPATIENT)
Dept: URGENT CARE | Facility: URGENT CARE | Age: 38
End: 2023-02-21
Payer: COMMERCIAL

## 2023-02-21 VITALS
RESPIRATION RATE: 18 BRPM | OXYGEN SATURATION: 94 % | SYSTOLIC BLOOD PRESSURE: 129 MMHG | TEMPERATURE: 98.3 F | DIASTOLIC BLOOD PRESSURE: 84 MMHG | HEART RATE: 98 BPM | WEIGHT: 192 LBS | BODY MASS INDEX: 30.07 KG/M2

## 2023-02-21 DIAGNOSIS — R45.82 WORRIES: ICD-10-CM

## 2023-02-21 DIAGNOSIS — R10.12 ABDOMINAL PAIN, LEFT UPPER QUADRANT: Primary | ICD-10-CM

## 2023-02-21 LAB
ALBUMIN SERPL BCG-MCNC: 4.6 G/DL (ref 3.5–5.2)
ALP SERPL-CCNC: 59 U/L (ref 35–104)
ALT SERPL W P-5'-P-CCNC: 21 U/L (ref 10–35)
AMYLASE SERPL-CCNC: 72 U/L (ref 28–100)
ANION GAP SERPL CALCULATED.3IONS-SCNC: 11 MMOL/L (ref 7–15)
AST SERPL W P-5'-P-CCNC: 23 U/L (ref 10–35)
BASOPHILS # BLD AUTO: 0.1 10E3/UL (ref 0–0.2)
BASOPHILS NFR BLD AUTO: 1 %
BILIRUB SERPL-MCNC: 1.5 MG/DL
BUN SERPL-MCNC: 10.7 MG/DL (ref 6–20)
CALCIUM SERPL-MCNC: 10.2 MG/DL (ref 8.6–10)
CHLORIDE SERPL-SCNC: 101 MMOL/L (ref 98–107)
CREAT SERPL-MCNC: 0.87 MG/DL (ref 0.51–0.95)
DEPRECATED HCO3 PLAS-SCNC: 26 MMOL/L (ref 22–29)
EOSINOPHIL # BLD AUTO: 0.1 10E3/UL (ref 0–0.7)
EOSINOPHIL NFR BLD AUTO: 2 %
ERYTHROCYTE [DISTWIDTH] IN BLOOD BY AUTOMATED COUNT: 12.6 % (ref 10–15)
GFR SERPL CREATININE-BSD FRML MDRD: 87 ML/MIN/1.73M2
GLUCOSE SERPL-MCNC: 107 MG/DL (ref 70–99)
HCT VFR BLD AUTO: 43.1 % (ref 35–47)
HGB BLD-MCNC: 14.5 G/DL (ref 11.7–15.7)
IMM GRANULOCYTES # BLD: 0 10E3/UL
IMM GRANULOCYTES NFR BLD: 0 %
LIPASE SERPL-CCNC: 70 U/L (ref 13–60)
LYMPHOCYTES # BLD AUTO: 1.7 10E3/UL (ref 0.8–5.3)
LYMPHOCYTES NFR BLD AUTO: 20 %
MCH RBC QN AUTO: 30 PG (ref 26.5–33)
MCHC RBC AUTO-ENTMCNC: 33.6 G/DL (ref 31.5–36.5)
MCV RBC AUTO: 89 FL (ref 78–100)
MONOCYTES # BLD AUTO: 0.4 10E3/UL (ref 0–1.3)
MONOCYTES NFR BLD AUTO: 5 %
NEUTROPHILS # BLD AUTO: 6.5 10E3/UL (ref 1.6–8.3)
NEUTROPHILS NFR BLD AUTO: 73 %
PLATELET # BLD AUTO: 240 10E3/UL (ref 150–450)
POTASSIUM SERPL-SCNC: 4.2 MMOL/L (ref 3.4–5.3)
PROT SERPL-MCNC: 8 G/DL (ref 6.4–8.3)
RBC # BLD AUTO: 4.83 10E6/UL (ref 3.8–5.2)
SODIUM SERPL-SCNC: 138 MMOL/L (ref 136–145)
WBC # BLD AUTO: 8.9 10E3/UL (ref 4–11)

## 2023-02-21 PROCEDURE — 85025 COMPLETE CBC W/AUTO DIFF WBC: CPT | Performed by: PHYSICIAN ASSISTANT

## 2023-02-21 PROCEDURE — 99203 OFFICE O/P NEW LOW 30 MIN: CPT | Performed by: PHYSICIAN ASSISTANT

## 2023-02-21 PROCEDURE — 82150 ASSAY OF AMYLASE: CPT | Performed by: PHYSICIAN ASSISTANT

## 2023-02-21 PROCEDURE — 36415 COLL VENOUS BLD VENIPUNCTURE: CPT | Performed by: PHYSICIAN ASSISTANT

## 2023-02-21 PROCEDURE — 83690 ASSAY OF LIPASE: CPT | Performed by: PHYSICIAN ASSISTANT

## 2023-02-21 PROCEDURE — 80053 COMPREHEN METABOLIC PANEL: CPT | Performed by: PHYSICIAN ASSISTANT

## 2023-02-21 ASSESSMENT — ENCOUNTER SYMPTOMS
CHILLS: 0
DIARRHEA: 0
NAUSEA: 0
SHORTNESS OF BREATH: 0
CONSTIPATION: 0
ADENOPATHY: 1
WHEEZING: 0
HEADACHES: 1
RHINORRHEA: 0
COUGH: 1
ABDOMINAL PAIN: 0
VOMITING: 0
FATIGUE: 0
SORE THROAT: 0
FEVER: 0

## 2023-02-21 NOTE — PROGRESS NOTES
"SUBJECTIVE:   Princess Bonner is a 37 year old female presenting with a chief complaint of   Chief Complaint   Patient presents with     Urgent Care     Throat Problem     Per patient states her lymph nodes have been swollen for the past 4 weeks.      Spleen     Per patient states she has been having spleen pain for the past 6 months states she had a rib that was dislocated in that same spot so not sure if its related. But more noticeable on 1/27 , states there are days where she has no pain but mainly does have pain mainly mornings. Patient has been taking spleen supplements and tumeric        She is an established patient of [facility].    Patient endorses ongoing cervical LAD for several weeks after experiencing a cold, which cough, congestion, sore throat. Those symptoms have grossly improved (other than ongoing ear pain), however, she continues to experience persistent swollen lymph nodes. Swelling improves after she works out. Not painful.   Patient also states that her \"spleen is aggravated\", with ongoing discomfort in her RUQ/beneath her ribs for over 6 months. Symptoms seem to worsen after she eats, improves with Ibuprofen, and supplements she takes for her spleen (unsure what these are). Patient does endorse a headache this morning but associates that with dehydration. Otherwise denies abdominal pain, nausea, vomiting, diarrhea, chest pain, shortness of breath, sore throat, or any other complaints.  Of note, the patient does not currently have any pain in her LUQ or surrounding tissues.      Review of Systems   Constitutional: Negative for chills, fatigue and fever.   HENT: Positive for ear pain. Negative for congestion, rhinorrhea and sore throat.    Respiratory: Positive for cough (mild; mostly resolved). Negative for shortness of breath and wheezing.    Cardiovascular: Negative for chest pain.   Gastrointestinal: Negative for abdominal pain, constipation, diarrhea, nausea and vomiting.        Positive for " spleen pain (beneath lower ribs, left side).   Neurological: Positive for headaches (resolved).   Hematological: Positive for adenopathy.   All other systems reviewed and are negative.      Past Medical History:   Diagnosis Date     Cervical dysplasia, mild 11/2018     Cervical high risk HPV (human papillomavirus) test positive 10/31/2018    10/31/18:See problem list.      Thyroid disease 4/18     Uncomplicated asthma 12/15     Family History   Problem Relation Age of Onset     No Known Problems Mother      Hyperlipidemia Father      Other Cancer Father         Skin     Depression Brother      Mental Illness Brother      Substance Abuse Brother      No Known Problems Sister      Cerebrovascular Disease Maternal Grandmother      No Known Problems Maternal Grandfather      Cerebrovascular Disease Paternal Grandmother      Other Cancer Other         Aunt - Pancreatic     Other Cancer Other         Uncle - Pancreatic     Other Cancer Cousin         Cousin - Kidney     Asthma Sister      Thyroid Disease Sister      Current Outpatient Medications   Medication Sig Dispense Refill     albuterol (PROAIR HFA/PROVENTIL HFA/VENTOLIN HFA) 108 (90 Base) MCG/ACT inhaler Inhale 2 puffs into the lungs every 6 hours 18 g 1     atorvastatin (LIPITOR) 20 MG tablet TAKE 1 TABLET BY MOUTH EVERY DAY 90 tablet 2     levothyroxine (SYNTHROID/LEVOTHROID) 75 MCG tablet TAKE 1 TABLET BY MOUTH EVERY DAY 90 tablet 2     Multiple Vitamins-Minerals (MULTIVITAMIN PO)        Omega-3 Fish Oil 500 MG capsule Take 1 capsule (500 mg) by mouth daily 90 capsule 1     azithromycin (ZITHROMAX) 250 MG tablet Two tablets first day, then one tablet daily for four days. (Patient not taking: Reported on 2/21/2023) 6 tablet 0     norethindrone-ethinyl estradiol (LOESTRIN 1/20, 21,) 1-20 MG-MCG tablet Take 1 tablet by mouth daily (Patient not taking: Reported on 2/21/2023) 84 tablet 2     Social History     Tobacco Use     Smoking status: Never     Smokeless  tobacco: Never   Substance Use Topics     Alcohol use: Yes     Comment: Occ       OBJECTIVE  /84   Pulse 98   Temp 98.3  F (36.8  C) (Tympanic)   Resp 18   Wt 87.1 kg (192 lb)   SpO2 94%   BMI 30.07 kg/m      Physical Exam  Vitals reviewed.   Constitutional:       General: She is not in acute distress.     Appearance: Normal appearance. She is not ill-appearing or toxic-appearing.   HENT:      Head: Normocephalic and atraumatic.      Right Ear: Tympanic membrane, ear canal and external ear normal. No tenderness. No mastoid tenderness.      Left Ear: Tympanic membrane, ear canal and external ear normal. No tenderness. No mastoid tenderness.      Nose: Nose normal. No rhinorrhea.      Mouth/Throat:      Mouth: Mucous membranes are moist.      Pharynx: No posterior oropharyngeal erythema.   Eyes:      Conjunctiva/sclera: Conjunctivae normal.   Cardiovascular:      Rate and Rhythm: Normal rate and regular rhythm.      Heart sounds: Normal heart sounds.   Pulmonary:      Effort: Pulmonary effort is normal.      Breath sounds: Normal breath sounds.   Abdominal:      General: Bowel sounds are normal.      Palpations: Abdomen is soft. There is no hepatomegaly, splenomegaly or mass.      Tenderness: There is no abdominal tenderness. There is no guarding or rebound. Negative signs include Rovsing's sign and McBurney's sign.   Musculoskeletal:      Cervical back: Neck supple. No tenderness.   Lymphadenopathy:      Cervical: No cervical adenopathy.   Skin:     General: Skin is warm and dry.   Neurological:      General: No focal deficit present.      Mental Status: She is alert.         Labs:  Results for orders placed or performed in visit on 02/21/23 (from the past 24 hour(s))   CBC with platelets and differential    Narrative    The following orders were created for panel order CBC with platelets and differential.  Procedure                               Abnormality         Status                     ---------                                -----------         ------                     CBC with platelets and d...[[id number]]                      Final result                 Please view results for these tests on the individual orders.   CBC with platelets and differential   Result Value Ref Range    WBC Count 8.9 4.0 - 11.0 10e3/uL    RBC Count 4.83 3.80 - 5.20 10e6/uL    Hemoglobin 14.5 11.7 - 15.7 g/dL    Hematocrit 43.1 35.0 - 47.0 %    MCV 89 78 - 100 fL    MCH 30.0 26.5 - 33.0 pg    MCHC 33.6 31.5 - 36.5 g/dL    RDW 12.6 10.0 - 15.0 %    Platelet Count 240 150 - 450 10e3/uL    % Neutrophils 73 %    % Lymphocytes 20 %    % Monocytes 5 %    % Eosinophils 2 %    % Basophils 1 %    % Immature Granulocytes 0 %    Absolute Neutrophils 6.5 1.6 - 8.3 10e3/uL    Absolute Lymphocytes 1.7 0.8 - 5.3 10e3/uL    Absolute Monocytes 0.4 0.0 - 1.3 10e3/uL    Absolute Eosinophils 0.1 0.0 - 0.7 10e3/uL    Absolute Basophils 0.1 0.0 - 0.2 10e3/uL    Absolute Immature Granulocytes 0.0 <=0.4 10e3/uL       X-Ray was not done.    ASSESSMENT:      ICD-10-CM    1. Abdominal pain, left upper quadrant  R10.12 CBC with platelets and differential     Comprehensive metabolic panel (BMP + Alb, Alk Phos, ALT, AST, Total. Bili, TP)     Amylase     Lipase      2. Worries  R45.82            Medical Decision Making:    Differential Diagnosis:  Abdominal Pain: Abdominal Wall    Serious Comorbid Conditions:  Adult:  None    PLAN:    ABD Pain:  Exam and work up was reassuring today. Keep the appointment with PCP to discuss symptoms and next steps. Continue supportive cares with Tylenol, ibuprofen, ice prn.     Followup:    If not improving or if condition worsens, follow up with your Primary Care Provider, If not improving or if conditions worsens over the next 12-24 hours, go to the Emergency Department    There are no Patient Instructions on file for this visit.

## 2023-03-16 ENCOUNTER — OFFICE VISIT (OUTPATIENT)
Dept: FAMILY MEDICINE | Facility: CLINIC | Age: 38
End: 2023-03-16
Payer: COMMERCIAL

## 2023-03-16 VITALS
DIASTOLIC BLOOD PRESSURE: 78 MMHG | RESPIRATION RATE: 16 BRPM | TEMPERATURE: 97.5 F | HEART RATE: 95 BPM | HEIGHT: 66 IN | OXYGEN SATURATION: 97 % | BODY MASS INDEX: 29.99 KG/M2 | WEIGHT: 186.6 LBS | SYSTOLIC BLOOD PRESSURE: 128 MMHG

## 2023-03-16 DIAGNOSIS — F12.90 CANNABIS USE, UNCOMPLICATED: ICD-10-CM

## 2023-03-16 DIAGNOSIS — K21.9 GASTROESOPHAGEAL REFLUX DISEASE, UNSPECIFIED WHETHER ESOPHAGITIS PRESENT: ICD-10-CM

## 2023-03-16 DIAGNOSIS — R13.10 ODYNOPHAGIA: ICD-10-CM

## 2023-03-16 DIAGNOSIS — R07.89 LEFT-SIDED CHEST WALL PAIN: Primary | ICD-10-CM

## 2023-03-16 DIAGNOSIS — M62.838 MUSCLE SPASM: ICD-10-CM

## 2023-03-16 PROCEDURE — 99214 OFFICE O/P EST MOD 30 MIN: CPT | Performed by: INTERNAL MEDICINE

## 2023-03-16 ASSESSMENT — PAIN SCALES - GENERAL: PAINLEVEL: MILD PAIN (2)

## 2023-03-16 NOTE — PATIENT INSTRUCTIONS
As discussed your throat pain is possibly related GERD and will send medication to pharmacy.     Sent in muscle relaxor to your pharmacy as already discussed on side effects.     ======================================

## 2023-03-16 NOTE — PROGRESS NOTES
Assessment and Plan  1. Left-sided chest wall pain  2. Muscle spasm  New problem, given pt ongoing left chest wall pain which she endorses started since 8/2022 with possible injury with unnoticed she states by her weight lifting workouts but no direct trauma to chest wall. This has been fixed by Chropractor. Intemittent flare ups which resolves after work outs she states typically and resolves after food. Mostly seen as she wakes up from sleep   - Physical exam negative for any tenderness on palpation of the abdomen or rib cage pain today. All the labs in 2/2023 showing improvement though mild hyperbilirubinemia seen is familial most likely as seen in the past at borderline. Shared decision to recheck labs in physical as no acute needs at this time.   - tiZANidine (ZANAFLEX) 4 MG tablet; Take 1 tablet (4 mg) by mouth nightly as needed for muscle spasms  Dispense: 30 tablet; Refill: 1    3. Gastroesophageal reflux disease, unspecified whether esophagitis present  4. Odynophagia  New problem, given ENT exam normal and pt depicting her pain during swallowing  The salads mainly with food sticking sensation to the throat will treat this as GERD and emperic PPI sent to pharmacy. AVS below to follow up in 4 weeks if no improvement.   - omeprazole (PRILOSEC) 20 MG DR capsule; Take 1 capsule (20 mg) by mouth daily  Dispense: 30 capsule; Refill: 0    5. Cannabis use, uncomplicated  Recent Lipase was mildly elevated, but no acute signs at this time. Discussed on avoiding all the Substances which she used for relief of this pain including cannabis which could be causing mild inflammatory state including elevated lipase at that time in 2/2023, Given no clinical signs of abdominal pain at this time, I do not think she needs any labs at this time. Can re-evaluate if she develops any symptoms.        Patient Instructions   As discussed your throat pain is possibly related GERD and will send medication to pharmacy.     Sent in  muscle relaxor to your pharmacy as already discussed on side effects.     ======================================        Return in about 4 weeks (around 4/13/2023), or if symptoms worsen or fail to improve, for If symptoms persist, Follow up of last visit.    Isabel Morales MD  [facility] JASSON Sánchez is a 38 year old, presenting for the following health issues:  Abdominal Pain (LUQ) and Flank Pain (Left side )      History of Present Illness       Reason for visit:  Had a pain in my upper left abdomen since February - pain has decreased since it started but still there  Symptom onset:  More than a month  Symptoms include:  Upper left abdomen pain  Symptom intensity:  Mild  Symptom progression:  Improving  Had these symptoms before:  Yes  Has tried/received treatment for these symptoms:  No  What makes it better:  Exercise    She eats 4 or more servings of fruits and vegetables daily.She consumes 0 sweetened beverage(s) daily.She exercises with enough effort to increase her heart rate 30 to 60 minutes per day.  She exercises with enough effort to increase her heart rate 5 days per week.   She is taking medications regularly.     Last seen patient in October 2022 for annual physical, sent an E-visit for birth control pills to be started off at her age of 38 years.  Recent urgent care visit in February 21, 2023 with concerns of sore throat and cervical lymphadenopathy which she states has been ongoing since more than 2 weeks.  She is here for reevaluation of the same.  Last CBC at that time normal.     No Known Allergies     Past Medical History:   Diagnosis Date     Cervical dysplasia, mild 11/2018     Cervical high risk HPV (human papillomavirus) test positive 10/31/2018    10/31/18:See problem list.      Thyroid disease 4/18     Uncomplicated asthma 12/15       Past Surgical History:   Procedure Laterality Date     CRYOTHERAPY, CERVICAL  2008     wisdom teeth removed       "first surgery was 2010, had bottom wosdom teeth taken out in August 2018       Family History   Problem Relation Age of Onset     No Known Problems Mother      Hyperlipidemia Father      Other Cancer Father         Skin     Depression Brother      Mental Illness Brother      Substance Abuse Brother      No Known Problems Sister      Cerebrovascular Disease Maternal Grandmother      No Known Problems Maternal Grandfather      Cerebrovascular Disease Paternal Grandmother      Other Cancer Other         Aunt - Pancreatic     Other Cancer Other         Uncle - Pancreatic     Other Cancer Cousin         Cousin - Kidney     Asthma Sister      Thyroid Disease Sister        Social History     Tobacco Use     Smoking status: Never     Smokeless tobacco: Never   Substance Use Topics     Alcohol use: Yes     Comment: Occ        Current Outpatient Medications   Medication     albuterol (PROAIR HFA/PROVENTIL HFA/VENTOLIN HFA) 108 (90 Base) MCG/ACT inhaler     atorvastatin (LIPITOR) 20 MG tablet     levothyroxine (SYNTHROID/LEVOTHROID) 75 MCG tablet     Multiple Vitamins-Minerals (MULTIVITAMIN PO)     Omega-3 Fish Oil 500 MG capsule     omeprazole (PRILOSEC) 20 MG DR capsule     tiZANidine (ZANAFLEX) 4 MG tablet     azithromycin (ZITHROMAX) 250 MG tablet     norethindrone-ethinyl estradiol (LOESTRIN 1/20, 21,) 1-20 MG-MCG tablet     No current facility-administered medications for this visit.        Review of Systems   Constitutional, HEENT, cardiovascular, pulmonary, GI, , musculoskeletal, neuro, skin, endocrine and psych systems are negative, except as otherwise noted.      Objective    /78 (BP Location: Left arm, Patient Position: Sitting, Cuff Size: Adult Regular)   Pulse 95   Temp 97.5  F (36.4  C) (Tympanic)   Resp 16   Ht 1.67 m (5' 5.75\")   Wt 84.6 kg (186 lb 9.6 oz)   LMP 02/23/2023 (Exact Date)   SpO2 97%   BMI 30.35 kg/m    Body mass index is 30.35 kg/m .  Physical Exam   GENERAL: healthy, alert and no " distress  NECK: no adenopathy, no asymmetry, masses, or scars and thyroid normal to palpation  RESP: lungs clear to auscultation - no rales, rhonchi or wheezes  CV: regular rate and rhythm, normal S1 S2, no S3 or S4, no murmur, click or rub, no peripheral edema and peripheral pulses strong  CHEST WALL : NO tenderness on palpation of the left chest wall which is the area of concern as pt states.   ABDOMEN: soft, nontender, no hepatosplenomegaly, no masses and bowel sounds normal  MS: no gross musculoskeletal defects noted, no edema    Labs reviewed and discussed with the patient.

## 2023-04-07 DIAGNOSIS — M62.838 MUSCLE SPASM: ICD-10-CM

## 2023-04-07 DIAGNOSIS — K21.9 GASTROESOPHAGEAL REFLUX DISEASE, UNSPECIFIED WHETHER ESOPHAGITIS PRESENT: ICD-10-CM

## 2023-04-07 DIAGNOSIS — R07.89 LEFT-SIDED CHEST WALL PAIN: ICD-10-CM

## 2023-04-07 NOTE — TELEPHONE ENCOUNTER
Upon chart review, Omeprazole started during last office visit. Recommendation for patient to follow-up in 4 weeks with update. [employer] message sent to the patient asking for an update.    Will route refill requests to PCP for review.     Julissa Andre RN

## 2023-04-30 DIAGNOSIS — M62.838 MUSCLE SPASM: ICD-10-CM

## 2023-04-30 DIAGNOSIS — K21.9 GASTROESOPHAGEAL REFLUX DISEASE, UNSPECIFIED WHETHER ESOPHAGITIS PRESENT: ICD-10-CM

## 2023-04-30 DIAGNOSIS — R07.89 LEFT-SIDED CHEST WALL PAIN: ICD-10-CM

## 2023-05-08 ENCOUNTER — MYC MEDICAL ADVICE (OUTPATIENT)
Dept: FAMILY MEDICINE | Facility: CLINIC | Age: 38
End: 2023-05-08
Payer: COMMERCIAL

## 2023-05-08 DIAGNOSIS — K21.9 GASTROESOPHAGEAL REFLUX DISEASE, UNSPECIFIED WHETHER ESOPHAGITIS PRESENT: ICD-10-CM

## 2023-05-28 DIAGNOSIS — M62.838 MUSCLE SPASM: ICD-10-CM

## 2023-05-28 DIAGNOSIS — R07.89 LEFT-SIDED CHEST WALL PAIN: ICD-10-CM

## 2023-06-09 ENCOUNTER — LAB (OUTPATIENT)
Dept: LAB | Facility: CLINIC | Age: 38
End: 2023-06-09
Payer: COMMERCIAL

## 2023-06-09 ENCOUNTER — OFFICE VISIT (OUTPATIENT)
Dept: GASTROENTEROLOGY | Facility: CLINIC | Age: 38
End: 2023-06-09
Attending: INTERNAL MEDICINE
Payer: COMMERCIAL

## 2023-06-09 ENCOUNTER — ANCILLARY PROCEDURE (OUTPATIENT)
Dept: ULTRASOUND IMAGING | Facility: CLINIC | Age: 38
End: 2023-06-09
Attending: STUDENT IN AN ORGANIZED HEALTH CARE EDUCATION/TRAINING PROGRAM
Payer: COMMERCIAL

## 2023-06-09 VITALS
OXYGEN SATURATION: 97 % | DIASTOLIC BLOOD PRESSURE: 79 MMHG | BODY MASS INDEX: 30.74 KG/M2 | HEIGHT: 66 IN | WEIGHT: 191.3 LBS | SYSTOLIC BLOOD PRESSURE: 116 MMHG | HEART RATE: 79 BPM

## 2023-06-09 DIAGNOSIS — R59.1 LYMPHADENOPATHY: Primary | ICD-10-CM

## 2023-06-09 DIAGNOSIS — R13.10 ODYNOPHAGIA: ICD-10-CM

## 2023-06-09 DIAGNOSIS — R59.1 LYMPHADENOPATHY: ICD-10-CM

## 2023-06-09 DIAGNOSIS — K21.9 GASTROESOPHAGEAL REFLUX DISEASE, UNSPECIFIED WHETHER ESOPHAGITIS PRESENT: ICD-10-CM

## 2023-06-09 LAB
ALBUMIN SERPL BCG-MCNC: 4.8 G/DL (ref 3.5–5.2)
ALP SERPL-CCNC: 58 U/L (ref 35–104)
ALT SERPL W P-5'-P-CCNC: 28 U/L (ref 10–35)
ANION GAP SERPL CALCULATED.3IONS-SCNC: 11 MMOL/L (ref 7–15)
AST SERPL W P-5'-P-CCNC: 23 U/L (ref 10–35)
BASOPHILS # BLD AUTO: 0.1 10E3/UL (ref 0–0.2)
BASOPHILS NFR BLD AUTO: 1 %
BILIRUB SERPL-MCNC: 1.1 MG/DL
BUN SERPL-MCNC: 10.9 MG/DL (ref 6–20)
CALCIUM SERPL-MCNC: 9.9 MG/DL (ref 8.6–10)
CHLORIDE SERPL-SCNC: 105 MMOL/L (ref 98–107)
CREAT SERPL-MCNC: 0.75 MG/DL (ref 0.51–0.95)
DEPRECATED HCO3 PLAS-SCNC: 23 MMOL/L (ref 22–29)
EOSINOPHIL # BLD AUTO: 0.1 10E3/UL (ref 0–0.7)
EOSINOPHIL NFR BLD AUTO: 2 %
ERYTHROCYTE [DISTWIDTH] IN BLOOD BY AUTOMATED COUNT: 12.7 % (ref 10–15)
GFR SERPL CREATININE-BSD FRML MDRD: >90 ML/MIN/1.73M2
GLUCOSE SERPL-MCNC: 103 MG/DL (ref 70–99)
HCT VFR BLD AUTO: 41.8 % (ref 35–47)
HGB BLD-MCNC: 14.2 G/DL (ref 11.7–15.7)
IMM GRANULOCYTES # BLD: 0 10E3/UL
IMM GRANULOCYTES NFR BLD: 0 %
LYMPHOCYTES # BLD AUTO: 1.7 10E3/UL (ref 0.8–5.3)
LYMPHOCYTES NFR BLD AUTO: 21 %
MCH RBC QN AUTO: 30.1 PG (ref 26.5–33)
MCHC RBC AUTO-ENTMCNC: 34 G/DL (ref 31.5–36.5)
MCV RBC AUTO: 89 FL (ref 78–100)
MONOCYTES # BLD AUTO: 0.6 10E3/UL (ref 0–1.3)
MONOCYTES NFR BLD AUTO: 7 %
NEUTROPHILS # BLD AUTO: 5.8 10E3/UL (ref 1.6–8.3)
NEUTROPHILS NFR BLD AUTO: 69 %
NRBC # BLD AUTO: 0 10E3/UL
NRBC BLD AUTO-RTO: 0 /100
PLATELET # BLD AUTO: 171 10E3/UL (ref 150–450)
POTASSIUM SERPL-SCNC: 3.8 MMOL/L (ref 3.4–5.3)
PROT SERPL-MCNC: 7.8 G/DL (ref 6.4–8.3)
RBC # BLD AUTO: 4.71 10E6/UL (ref 3.8–5.2)
SODIUM SERPL-SCNC: 139 MMOL/L (ref 136–145)
TSH SERPL DL<=0.005 MIU/L-ACNC: 1.52 UIU/ML (ref 0.3–4.2)
WBC # BLD AUTO: 8.3 10E3/UL (ref 4–11)

## 2023-06-09 PROCEDURE — 76536 US EXAM OF HEAD AND NECK: CPT | Performed by: RADIOLOGY

## 2023-06-09 PROCEDURE — 36415 COLL VENOUS BLD VENIPUNCTURE: CPT | Performed by: PATHOLOGY

## 2023-06-09 PROCEDURE — 99205 OFFICE O/P NEW HI 60 MIN: CPT | Performed by: STUDENT IN AN ORGANIZED HEALTH CARE EDUCATION/TRAINING PROGRAM

## 2023-06-09 PROCEDURE — 99417 PROLNG OP E/M EACH 15 MIN: CPT | Performed by: STUDENT IN AN ORGANIZED HEALTH CARE EDUCATION/TRAINING PROGRAM

## 2023-06-09 PROCEDURE — 80050 GENERAL HEALTH PANEL: CPT | Performed by: PATHOLOGY

## 2023-06-09 ASSESSMENT — PAIN SCALES - GENERAL: PAINLEVEL: NO PAIN (1)

## 2023-06-09 NOTE — PROGRESS NOTES
GI CLINIC VISIT    CC/REFERRING MD:  Isabel Morales  REASON FOR CONSULTATION:   Princess Bonner is a 38 year old female who I was asked to see in consultation at the request of Dr. Isabel Morales for GERD and odynophagia  Chief Complaint   Patient presents with     New Patient       ASSESSMENT/PLAN:  Gastroesophageal reflux disease, unspecified whether esophagitis present  Odynophagia  She has had resolution of the sore throat that is lower towards the sternal notch with the Omeprazole both when she first started this medication and when she increased to 20 mg BID. She does not have classic GERD symptoms (no heartburn, acid reflux) or dyspeptic symptoms (epigastric pain), or regurgitation, voice changes, throat clearing, dysphagia, unintentional weight loss. She does have a burning sensation at times in the LUQ but this has happened 3 times over the past 2 months or so and never before. She has not had the burning in the LUQ in the past month since increasing PPI to 20 mg BID. She may have a component of peptic ulcer disease, and for that reason I recommended continuing on Omeprazole 20 mg BID for a full 8 weeks (one more month) before stepping down the dose to 20 mg daily for 2 weeks and then switching over to Pepcid as needed at night. I will plan to have her follow up in 2 months to see how things are going off of PPI. Future considerations include EGD, H. Pylori stool Ag, ENT referral.     Her odynophagia has resolved. She now has a sore throat in the mornings that improves throughout the day. I recommend she continue Allegra and add Flonase for this. She has a follow up next month with her PCP. I did also check CBC, CMP, TSH and these are within normal limits. If this sore throat persists, recommend referral to ENT for chronic sore throat.      - Adult GI  Referral - Consult Only  - CBC with platelets differential; Future  - Comprehensive metabolic panel; Future  - TSH with free T4 reflex; Future  -  US Head Neck Soft Tissue; Future  - Adult GI Clinic Follow-Up Order (Blank); Future    Lymphadenopathy  Her main concern is the palpable lymph node in her right anterior chain. It feels very small and is painless upon palpation today, but since it has been present for 3 months now, she is not currently ill, and it seemed to be painful after drinking alcohol, I ordered the below labs and an ultrasound. Labs are WNL, US shows benign appearing right level 2 cervical lymph nodes corresponding to the patient's palpable area of concern. This is reassuring, and recommend continued follow up with PCP.    - CBC with platelets differential; Future  - Comprehensive metabolic panel; Future  - TSH with free T4 reflex; Future  - US Head Neck Soft Tissue; Future  - Adult GI Clinic Follow-Up Order (Blank); Future        RTC 2 months      Thank you for this consultation. It was a pleasure to participate in the care of this patient; please contact us with any further questions.  A total of 55 face-to-face minutes was spent with this patient, >50% of which was counseling regarding the above delineated issues. An additional 22 minutes was spent on the date of the encounter doing chart review, documentation, and further activities as noted above.    Fabiola Perez PA-C  Division of Gastroenterology, Hepatology and Nutrition  [facility]    ---------------------------------------------------------------------------------------------------  HPI:    Princess Bonner is a 38 year old female with past medical history of hypothyroidism presents as referred by primary care provider for odynophagia and GERD.     Princess reports that she developed an upper respiratory infection back in January of 2023. She had seen [facility] for pharyngitis on 1/31/23. She remembers her cervical lymph nodes were swollen bilaterally for a few weeks, and then resolved. Her sore throat also resolved within that time frame. In March of 2023, she then  developed a soreness in her throat that is lower down, and points to the sternal notch. This soreness seemed to be worse at night, and worse with eating but was not sore every time she ate. She noticed that about every 1-2 days. She found it hurt when she swallowed food. Liquids did not hurt to swallow. She states by primary care she was started on Omeprazole 20 mg once daily, thinking this could be some type of acid reflux, as she was not feeling ill at the time otherwise. This seemed to help with the soreness for 1.5 weeks or so, but then in returned. The omeprazole was then increased to 20 mg BID and this helped with the soreness again. She remembers a time when she forgot to take the Omeprazole and the throat pain was worse. About 1 month after the soreness that was lower in her throat, she began to notice a lump on the right side of her neck which she believes is likely a swollen lymph node. Notably, she reports that she drank alcohol over Memorial day weekend and this seemed to make the lymph node more sore on the right side. Since it showed up in April, she notes it has decreased in size, but is still present. It was never big enough where she could see it when looking at her neck in the mirror.     Now, for the past week, she reports a sore throat again in her posterior pharynx, higher up from the soreness she was experiencing above the sternal notch. She notes it is sore in the morning and improves throughout the day. Her throat is not sore when eating. It does not hurt when she eats breakfast or drinks water. She is not sure if she snores. She does have seasonal allergies, for which she takes Allegra. Notes a mild chronic cough during her seasonal allergies for which Allegra has been helpful.    She does have some LUQ burning, which she has experienced 3 times in the past 2 months, and has never experienced this before. She does not have heartburn, regurgitation, or acid taste/sensation in the mouth ever.  She also denies voice changes, loss of appetite, throat clearing, dysphagia, history of mouth ulcers, fevers, chills, vomiting, or unintentional weight loss.     She has continued on Omeprazole 20 mg BID for 1 month now.     NSAIDs: occasionally - once per month, ibuprofen 400 mg  BMs: qAM, easy to pass, sometimes another later on during the day    Father with stomach problems, unknown if he had an ulcer, but has never had surgery for his GI problems    Wt Readings from Last 10 Encounters:   06/09/23 86.8 kg (191 lb 4.8 oz)   03/16/23 84.6 kg (186 lb 9.6 oz)   02/21/23 87.1 kg (192 lb)   09/14/21 85.3 kg (188 lb)   10/16/20 83 kg (183 lb)   10/05/20 83.7 kg (184 lb 9.6 oz)   10/24/19 85.3 kg (188 lb)   10/14/19 84.8 kg (187 lb)   10/31/18 84.6 kg (186 lb 8 oz)       ROS:    A 10 point review of systems was performed and is negative except as noted in the HPI.      PROBLEM LIST  Patient Active Problem List    Diagnosis Date Noted     Cannabis use, uncomplicated 03/16/2023     Priority: Medium     Anxiety 10/08/2021     Priority: Medium     Chronic zinc deficiency 09/18/2021     Priority: Medium     Formatting of this note might be different from the original.  Very Severe Zinc Deficiency (Zn 46, Cu/Zn ratio 1.96), normal copper levels (Cu 90, 8% Free Cu, retirement 8), undermethylation (), normal pyrroles (6)       Cervical dysplasia, mild 11/01/2018     Priority: Medium     Acquired hypothyroidism 10/31/2018     Priority: Medium     Mixed hyperlipidemia 10/31/2018     Priority: Medium     Cervical high risk HPV (human papillomavirus) test positive 10/31/2018     Priority: Medium     2008 Cryotherapy  10/31/18: NIL pap, + HR HPV type 16. Plan [place].   11/13/18 [place] Bx--BELA 1. Plan: pap in 1 year.  10/24/19 NIL pap, Neg HPV. Plan: cotest in 3 years  10/5/20 NIL pap, neg HR HPV. Plan 3 year cotest         Cervical cancer screening 10/29/2018     Priority: Medium       PERTINENT PAST MEDICAL HISTORY:  Past Medical  History:   Diagnosis Date     Cervical dysplasia, mild 11/2018     Cervical high risk HPV (human papillomavirus) test positive 10/31/2018    10/31/18:See problem list.      Thyroid disease 4/18     Uncomplicated asthma 12/15       PREVIOUS SURGERIES:  Past Surgical History:   Procedure Laterality Date     CRYOTHERAPY, CERVICAL  2008     wisdom teeth removed      first surgery was 2010, had bottom wosdom teeth taken out in August 2018       PREVIOUS ENDOSCOPY:  None      ALLERGIES:   No Known Allergies    PERTINENT MEDICATIONS:  Current Outpatient Medications   Medication     albuterol (PROAIR HFA/PROVENTIL HFA/VENTOLIN HFA) 108 (90 Base) MCG/ACT inhaler     atorvastatin (LIPITOR) 20 MG tablet     levothyroxine (SYNTHROID/LEVOTHROID) 75 MCG tablet     Multiple Vitamins-Minerals (MULTIVITAMIN PO)     Omega-3 Fish Oil 500 MG capsule     omeprazole (PRILOSEC) 20 MG DR capsule     tiZANidine (ZANAFLEX) 4 MG tablet     norethindrone-ethinyl estradiol (LOESTRIN 1/20, 21,) 1-20 MG-MCG tablet     No current facility-administered medications for this visit.       SOCIAL HISTORY:  Social History     Socioeconomic History     Marital status: Single     Spouse name: Not on file     Number of children: Not on file     Years of education: Not on file     Highest education level: Not on file   Occupational History     Employer: [employer]   Tobacco Use     Smoking status: Never     Smokeless tobacco: Never   Vaping Use     Vaping status: Never Used   Substance and Sexual Activity     Alcohol use: Yes     Comment: Occ     Drug use: No     Sexual activity: Not Currently     Partners: Male     Birth control/protection: Abstinence, Condom   Other Topics Concern     Parent/sibling w/ CABG, MI or angioplasty before 65F 55M? No   Social History Narrative    Went to [place] for part of her Grad school. Has lived in several places for work. 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     Social  "Determinants of Health     Financial Resource Strain: Not on file   Food Insecurity: Not on file   Transportation Needs: Not on file   Physical Activity: Not on file   Stress: Not on file   Social Connections: Not on file   Intimate Partner Violence: Not on file   Housing Stability: Not on file       FAMILY HISTORY:  Family History   Problem Relation Age of Onset     No Known Problems Mother      Hyperlipidemia Father      Other Cancer Father         Skin     Depression Brother      Mental Illness Brother      Substance Abuse Brother      No Known Problems Sister      Cerebrovascular Disease Maternal Grandmother      No Known Problems Maternal Grandfather      Cerebrovascular Disease Paternal Grandmother      Other Cancer Other         Aunt - Pancreatic     Other Cancer Other         Uncle - Pancreatic     Other Cancer Cousin         Cousin - Kidney     Asthma Sister      Thyroid Disease Sister        Past/family/social history reviewed and no changes    PHYSICAL EXAMINATION:  Vitals /79   Pulse 79   Ht 1.67 m (5' 5.75\")   Wt 86.8 kg (191 lb 4.8 oz)   SpO2 97%   BMI 31.11 kg/m     Wt   Wt Readings from Last 2 Encounters:   06/09/23 86.8 kg (191 lb 4.8 oz)   03/16/23 84.6 kg (186 lb 9.6 oz)      Gen: Pt sitting up on exam table in NAD, interactive and cooperative on exam  Eyes: sclerae anicteric, no injection  ENT:  OP clear, MMM, no tonsillar hypertrophy, uvula is midline, tympanic membranes are non bulging and not erythematous bilaterally. No thyromegaly or thyroid nodules palpated.  GI: Normoactive BS, abd soft, flat, nontender  Skin: Warm, dry, no jaundice, nails appear healthy  Lymph: Very slightly palpable right anterior cervical lymph node, nontender. No posterior cervical, postauricular, submandibular, submental or supraclavicular lymphadenopathy palpable.  Neuro: alert, oriented, answers questions appropriately      PERTINENT STUDIES:    Office Visit on 02/21/2023   Component Date Value Ref Range " Status     Sodium 02/21/2023 138  136 - 145 mmol/L Final     Potassium 02/21/2023 4.2  3.4 - 5.3 mmol/L Final     Chloride 02/21/2023 101  98 - 107 mmol/L Final     Carbon Dioxide (CO2) 02/21/2023 26  22 - 29 mmol/L Final     Anion Gap 02/21/2023 11  7 - 15 mmol/L Final     Urea Nitrogen 02/21/2023 10.7  6.0 - 20.0 mg/dL Final     Creatinine 02/21/2023 0.87  0.51 - 0.95 mg/dL Final     Calcium 02/21/2023 10.2 (H)  8.6 - 10.0 mg/dL Final     Glucose 02/21/2023 107 (H)  70 - 99 mg/dL Final     Alkaline Phosphatase 02/21/2023 59  35 - 104 U/L Final     AST 02/21/2023 23  10 - 35 U/L Final     ALT 02/21/2023 21  10 - 35 U/L Final     Protein Total 02/21/2023 8.0  6.4 - 8.3 g/dL Final     Albumin 02/21/2023 4.6  3.5 - 5.2 g/dL Final     Bilirubin Total 02/21/2023 1.5 (H)  <=1.2 mg/dL Final     GFR Estimate 02/21/2023 87  >60 mL/min/1.73m2 Final     Amylase 02/21/2023 72  28 - 100 U/L Final     Lipase 02/21/2023 70 (H)  13 - 60 U/L Final     WBC Count 02/21/2023 8.9  4.0 - 11.0 10e3/uL Final     RBC Count 02/21/2023 4.83  3.80 - 5.20 10e6/uL Final     Hemoglobin 02/21/2023 14.5  11.7 - 15.7 g/dL Final     Hematocrit 02/21/2023 43.1  35.0 - 47.0 % Final     MCV 02/21/2023 89  78 - 100 fL Final     MCH 02/21/2023 30.0  26.5 - 33.0 pg Final     MCHC 02/21/2023 33.6  31.5 - 36.5 g/dL Final     RDW 02/21/2023 12.6  10.0 - 15.0 % Final     Platelet Count 02/21/2023 240  150 - 450 10e3/uL Final     % Neutrophils 02/21/2023 73  % Final     % Lymphocytes 02/21/2023 20  % Final     % Monocytes 02/21/2023 5  % Final     % Eosinophils 02/21/2023 2  % Final     % Basophils 02/21/2023 1  % Final     % Immature Granulocytes 02/21/2023 0  % Final     Absolute Neutrophils 02/21/2023 6.5  1.6 - 8.3 10e3/uL Final     Absolute Lymphocytes 02/21/2023 1.7  0.8 - 5.3 10e3/uL Final     Absolute Monocytes 02/21/2023 0.4  0.0 - 1.3 10e3/uL Final     Absolute Eosinophils 02/21/2023 0.1  0.0 - 0.7 10e3/uL Final     Absolute Basophils 02/21/2023 0.1   0.0 - 0.2 10e3/uL Final     Absolute Immature Granulocytes 02/21/2023 0.0  <=0.4 10e3/uL Final

## 2023-06-09 NOTE — PATIENT INSTRUCTIONS
It was a pleasure taking care of you today.  I've included a brief summary of our discussion and care plan from today's visit below.  Please review this information with your primary care provider.  _______________________________________________________________________     My recommendations are summarized as follows:  -- Add Flonase once or twice daily to your allergy regimen, continue Allegra   -- Continue 20 mg Omeprazole twice daily for 1 more month and then go down to 20 mg once daily for at least 2 weeks before stopping it. In place of omeprazole at that time, you can switch to Pepcid (over the counter) nightly as needed   -- Labs ordered to assess red and white blood cells and thyroid   -- ultrasound ordered to look at the right neck/possible lymph node  -- Return in 2 months to see me (video visit works just fine)    ______________________________________________________________________     How do I schedule labs, imaging studies, or procedures that were ordered in clinic today?      Labs: To schedule lab appointment you can contact your local [facility] or call [phone] to schedule at any convenient [facility] location.      Imaging Studies: If you were scheduled for a CT scan, X-ray, MRI, ultrasound, HIDA scan or other imaging study, please call [phone] to have this scheduled.      Referral: If a referral to another specialty was ordered, expect a phone call or follow instructions above. If you have not heard from anyone regarding your referral in a week, please call our clinic to check the status.      Who do I call with any questions after my visit?  Please be in touch if there are any further questions that arise following today's visit.  There are multiple ways to contact your gastroenterology care team.       During business hours, you may reach a Gastroenterology nurse at [phone]     To schedule or reschedule an appointment, please call [phone].      You can always  send a secure message through vogogo.  vogogo messages are answered by your nurse or doctor typically within 24 hours.  Please allow extra time on weekends and holidays.       For urgent/emergent questions after business hours, you may reach the on-call GI Fellow by contacting the [facility]  at [phone].     How will I get the results of any tests ordered?    You will receive all of your results.  If you have signed up for vogogo, any tests ordered at your visit will be available to you after your physician reviews them.  Typically this takes 1-2 weeks.  If there are urgent results that require a change in your care plan, your physician or nurse will call you to discuss the next steps.       How to I schedule a follow-up visit?  If you did not schedule a follow-up visit today, please call [phone] to schedule a follow-up office visit.

## 2023-06-09 NOTE — NURSING NOTE
"Chief Complaint   Patient presents with     New Patient       Vitals:    06/09/23 1104   BP: 116/79   Pulse: 79   SpO2: 97%   Weight: 86.8 kg (191 lb 4.8 oz)   Height: 1.67 m (5' 5.75\")       Body mass index is 31.11 kg/m .    Miriam Morel MA    "

## 2023-06-09 NOTE — LETTER
6/9/2023         RE: Princess Bonner  [address]  [facility] [address]        Dear Colleague,    Thank you for referring your patient, Princess Bonner, to the [facility] GASTRO[facility]IC [place]. Please see a copy of my visit note below.    GI CLINIC VISIT    CC/REFERRING MD:  Isabel Morales  REASON FOR CONSULTATION:   Princess Bonner is a 38 year old female who I was asked to see in consultation at the request of Dr. Isabel Morales for GERD and odynophagia  Chief Complaint   Patient presents with    New Patient       ASSESSMENT/PLAN:  Gastroesophageal reflux disease, unspecified whether esophagitis present  Odynophagia  She has had resolution of the sore throat that is lower towards the sternal notch with the Omeprazole both when she first started this medication and when she increased to 20 mg BID. She does not have classic GERD symptoms (no heartburn, acid reflux) or dyspeptic symptoms (epigastric pain), or regurgitation, voice changes, throat clearing, dysphagia, unintentional weight loss. She does have a burning sensation at times in the LUQ but this has happened 3 times over the past 2 months or so and never before. She has not had the burning in the LUQ in the past month since increasing PPI to 20 mg BID. She may have a component of peptic ulcer disease, and for that reason I recommended continuing on Omeprazole 20 mg BID for a full 8 weeks (one more month) before stepping down the dose to 20 mg daily for 2 weeks and then switching over to Pepcid as needed at night. I will plan to have her follow up in 2 months to see how things are going off of PPI. Future considerations include EGD, H. Pylori stool Ag, ENT referral.     Her odynophagia has resolved. She now has a sore throat in the mornings that improves throughout the day. I recommend she continue Allegra and add Flonase for this. She has a follow up next month with her PCP. I did also check CBC, CMP, TSH and these are  within normal limits. If this sore throat persists, recommend referral to ENT for chronic sore throat.      - Adult GI  Referral - Consult Only  - CBC with platelets differential; Future  - Comprehensive metabolic panel; Future  - TSH with free T4 reflex; Future  - US Head Neck Soft Tissue; Future  - Adult GI Clinic Follow-Up Order (Blank); Future    Lymphadenopathy  Her main concern is the palpable lymph node in her right anterior chain. It feels very small and is painless upon palpation today, but since it has been present for 3 months now, she is not currently ill, and it seemed to be painful after drinking alcohol, I ordered the below labs and an ultrasound. Labs are WNL, US shows benign appearing right level 2 cervical lymph nodes corresponding to the patient's palpable area of concern. This is reassuring, and recommend continued follow up with PCP.    - CBC with platelets differential; Future  - Comprehensive metabolic panel; Future  - TSH with free T4 reflex; Future  - US Head Neck Soft Tissue; Future  - Adult GI Clinic Follow-Up Order (Blank); Future        RTC 2 months      Thank you for this consultation. It was a pleasure to participate in the care of this patient; please contact us with any further questions.  A total of 55 face-to-face minutes was spent with this patient, >50% of which was counseling regarding the above delineated issues. An additional 22 minutes was spent on the date of the encounter doing chart review, documentation, and further activities as noted above.    Fabiola Perez PA-C  Division of Gastroenterology, Hepatology and Nutrition  [facility]    ---------------------------------------------------------------------------------------------------  HPI:    Princess Bonner is a 38 year old female with past medical history of hypothyroidism presents as referred by primary care provider for odynophagia and GERD.     Princess reports that she developed an upper respiratory  infection back in January of 2023. She had seen [facility] for pharyngitis on 1/31/23. She remembers her cervical lymph nodes were swollen bilaterally for a few weeks, and then resolved. Her sore throat also resolved within that time frame. In March of 2023, she then developed a soreness in her throat that is lower down, and points to the sternal notch. This soreness seemed to be worse at night, and worse with eating but was not sore every time she ate. She noticed that about every 1-2 days. She found it hurt when she swallowed food. Liquids did not hurt to swallow. She states by primary care she was started on Omeprazole 20 mg once daily, thinking this could be some type of acid reflux, as she was not feeling ill at the time otherwise. This seemed to help with the soreness for 1.5 weeks or so, but then in returned. The omeprazole was then increased to 20 mg BID and this helped with the soreness again. She remembers a time when she forgot to take the Omeprazole and the throat pain was worse. About 1 month after the soreness that was lower in her throat, she began to notice a lump on the right side of her neck which she believes is likely a swollen lymph node. Notably, she reports that she drank alcohol over Memorial day weekend and this seemed to make the lymph node more sore on the right side. Since it showed up in April, she notes it has decreased in size, but is still present. It was never big enough where she could see it when looking at her neck in the mirror.     Now, for the past week, she reports a sore throat again in her posterior pharynx, higher up from the soreness she was experiencing above the sternal notch. She notes it is sore in the morning and improves throughout the day. Her throat is not sore when eating. It does not hurt when she eats breakfast or drinks water. She is not sure if she snores. She does have seasonal allergies, for which she takes Allegra. Notes a mild chronic cough during her  seasonal allergies for which Allegra has been helpful.    She does have some LUQ burning, which she has experienced 3 times in the past 2 months, and has never experienced this before. She does not have heartburn, regurgitation, or acid taste/sensation in the mouth ever. She also denies voice changes, loss of appetite, throat clearing, dysphagia, history of mouth ulcers, fevers, chills, vomiting, or unintentional weight loss.     She has continued on Omeprazole 20 mg BID for 1 month now.     NSAIDs: occasionally - once per month, ibuprofen 400 mg  BMs: qAM, easy to pass, sometimes another later on during the day    Father with stomach problems, unknown if he had an ulcer, but has never had surgery for his GI problems    Wt Readings from Last 10 Encounters:   06/09/23 86.8 kg (191 lb 4.8 oz)   03/16/23 84.6 kg (186 lb 9.6 oz)   02/21/23 87.1 kg (192 lb)   09/14/21 85.3 kg (188 lb)   10/16/20 83 kg (183 lb)   10/05/20 83.7 kg (184 lb 9.6 oz)   10/24/19 85.3 kg (188 lb)   10/14/19 84.8 kg (187 lb)   10/31/18 84.6 kg (186 lb 8 oz)       ROS:    A 10 point review of systems was performed and is negative except as noted in the HPI.      PROBLEM LIST  Patient Active Problem List    Diagnosis Date Noted    Cannabis use, uncomplicated 03/16/2023     Priority: Medium    Anxiety 10/08/2021     Priority: Medium    Chronic zinc deficiency 09/18/2021     Priority: Medium     Formatting of this note might be different from the original.  Very Severe Zinc Deficiency (Zn 46, Cu/Zn ratio 1.96), normal copper levels (Cu 90, 8% Free Cu, care home 8), undermethylation (), normal pyrroles (6)      Cervical dysplasia, mild 11/01/2018     Priority: Medium    Acquired hypothyroidism 10/31/2018     Priority: Medium    Mixed hyperlipidemia 10/31/2018     Priority: Medium    Cervical high risk HPV (human papillomavirus) test positive 10/31/2018     Priority: Medium     2008 Cryotherapy  10/31/18: NIL pap, + HR HPV type 16. Plan [place].    11/13/18 [place] Bx--BELA 1. Plan: pap in 1 year.  10/24/19 NIL pap, Neg HPV. Plan: cotest in 3 years  10/5/20 NIL pap, neg HR HPV. Plan 3 year cotest        Cervical cancer screening 10/29/2018     Priority: Medium       PERTINENT PAST MEDICAL HISTORY:  Past Medical History:   Diagnosis Date    Cervical dysplasia, mild 11/2018    Cervical high risk HPV (human papillomavirus) test positive 10/31/2018    10/31/18:See problem list.     Thyroid disease 4/18    Uncomplicated asthma 12/15       PREVIOUS SURGERIES:  Past Surgical History:   Procedure Laterality Date    CRYOTHERAPY, CERVICAL  2008    wisdom teeth removed      first surgery was 2010, had bottom wosdom teeth taken out in August 2018       PREVIOUS ENDOSCOPY:  None      ALLERGIES:   No Known Allergies    PERTINENT MEDICATIONS:  Current Outpatient Medications   Medication    albuterol (PROAIR HFA/PROVENTIL HFA/VENTOLIN HFA) 108 (90 Base) MCG/ACT inhaler    atorvastatin (LIPITOR) 20 MG tablet    levothyroxine (SYNTHROID/LEVOTHROID) 75 MCG tablet    Multiple Vitamins-Minerals (MULTIVITAMIN PO)    Omega-3 Fish Oil 500 MG capsule    omeprazole (PRILOSEC) 20 MG DR capsule    tiZANidine (ZANAFLEX) 4 MG tablet    norethindrone-ethinyl estradiol (LOESTRIN 1/20, 21,) 1-20 MG-MCG tablet     No current facility-administered medications for this visit.       SOCIAL HISTORY:  Social History     Socioeconomic History    Marital status: Single     Spouse name: Not on file    Number of children: Not on file    Years of education: Not on file    Highest education level: Not on file   Occupational History     Employer: [employer]   Tobacco Use    Smoking status: Never    Smokeless tobacco: Never   Vaping Use    Vaping status: Never Used   Substance and Sexual Activity    Alcohol use: Yes     Comment: Occ    Drug use: No    Sexual activity: Not Currently     Partners: Male     Birth control/protection: Abstinence, Condom   Other Topics Concern    Parent/sibling w/ CABG, MI or  "angioplasty before 65F 55M? No   Social History Narrative    Went to [place] for part of her Grad school. Has lived in several places for work. 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     Social Determinants of Health     Financial Resource Strain: Not on file   Food Insecurity: Not on file   Transportation Needs: Not on file   Physical Activity: Not on file   Stress: Not on file   Social Connections: Not on file   Intimate Partner Violence: Not on file   Housing Stability: Not on file       FAMILY HISTORY:  Family History   Problem Relation Age of Onset    No Known Problems Mother     Hyperlipidemia Father     Other Cancer Father         Skin    Depression Brother     Mental Illness Brother     Substance Abuse Brother     No Known Problems Sister     Cerebrovascular Disease Maternal Grandmother     No Known Problems Maternal Grandfather     Cerebrovascular Disease Paternal Grandmother     Other Cancer Other         Aunt - Pancreatic    Other Cancer Other         Uncle - Pancreatic    Other Cancer Cousin         Cousin - Kidney    Asthma Sister     Thyroid Disease Sister        Past/family/social history reviewed and no changes    PHYSICAL EXAMINATION:  Vitals /79   Pulse 79   Ht 1.67 m (5' 5.75\")   Wt 86.8 kg (191 lb 4.8 oz)   SpO2 97%   BMI 31.11 kg/m     Wt   Wt Readings from Last 2 Encounters:   06/09/23 86.8 kg (191 lb 4.8 oz)   03/16/23 84.6 kg (186 lb 9.6 oz)      Gen: Pt sitting up on exam table in NAD, interactive and cooperative on exam  Eyes: sclerae anicteric, no injection  ENT:  OP clear, MMM, no tonsillar hypertrophy, uvula is midline, tympanic membranes are non bulging and not erythematous bilaterally. No thyromegaly or thyroid nodules palpated.  GI: Normoactive BS, abd soft, flat, nontender  Skin: Warm, dry, no jaundice, nails appear healthy  Lymph: Very slightly palpable right anterior cervical lymph node, nontender. No posterior cervical, " postauricular, submandibular, submental or supraclavicular lymphadenopathy palpable.  Neuro: alert, oriented, answers questions appropriately      PERTINENT STUDIES:    Office Visit on 02/21/2023   Component Date Value Ref Range Status    Sodium 02/21/2023 138  136 - 145 mmol/L Final    Potassium 02/21/2023 4.2  3.4 - 5.3 mmol/L Final    Chloride 02/21/2023 101  98 - 107 mmol/L Final    Carbon Dioxide (CO2) 02/21/2023 26  22 - 29 mmol/L Final    Anion Gap 02/21/2023 11  7 - 15 mmol/L Final    Urea Nitrogen 02/21/2023 10.7  6.0 - 20.0 mg/dL Final    Creatinine 02/21/2023 0.87  0.51 - 0.95 mg/dL Final    Calcium 02/21/2023 10.2 (H)  8.6 - 10.0 mg/dL Final    Glucose 02/21/2023 107 (H)  70 - 99 mg/dL Final    Alkaline Phosphatase 02/21/2023 59  35 - 104 U/L Final    AST 02/21/2023 23  10 - 35 U/L Final    ALT 02/21/2023 21  10 - 35 U/L Final    Protein Total 02/21/2023 8.0  6.4 - 8.3 g/dL Final    Albumin 02/21/2023 4.6  3.5 - 5.2 g/dL Final    Bilirubin Total 02/21/2023 1.5 (H)  <=1.2 mg/dL Final    GFR Estimate 02/21/2023 87  >60 mL/min/1.73m2 Final    Amylase 02/21/2023 72  28 - 100 U/L Final    Lipase 02/21/2023 70 (H)  13 - 60 U/L Final    WBC Count 02/21/2023 8.9  4.0 - 11.0 10e3/uL Final    RBC Count 02/21/2023 4.83  3.80 - 5.20 10e6/uL Final    Hemoglobin 02/21/2023 14.5  11.7 - 15.7 g/dL Final    Hematocrit 02/21/2023 43.1  35.0 - 47.0 % Final    MCV 02/21/2023 89  78 - 100 fL Final    MCH 02/21/2023 30.0  26.5 - 33.0 pg Final    MCHC 02/21/2023 33.6  31.5 - 36.5 g/dL Final    RDW 02/21/2023 12.6  10.0 - 15.0 % Final    Platelet Count 02/21/2023 240  150 - 450 10e3/uL Final    % Neutrophils 02/21/2023 73  % Final    % Lymphocytes 02/21/2023 20  % Final    % Monocytes 02/21/2023 5  % Final    % Eosinophils 02/21/2023 2  % Final    % Basophils 02/21/2023 1  % Final    % Immature Granulocytes 02/21/2023 0  % Final    Absolute Neutrophils 02/21/2023 6.5  1.6 - 8.3 10e3/uL Final    Absolute Lymphocytes 02/21/2023  1.7  0.8 - 5.3 10e3/uL Final    Absolute Monocytes 02/21/2023 0.4  0.0 - 1.3 10e3/uL Final    Absolute Eosinophils 02/21/2023 0.1  0.0 - 0.7 10e3/uL Final    Absolute Basophils 02/21/2023 0.1  0.0 - 0.2 10e3/uL Final    Absolute Immature Granulocytes 02/21/2023 0.0  <=0.4 10e3/uL Final

## 2023-06-19 DIAGNOSIS — E78.1 PURE HYPERTRIGLYCERIDEMIA: ICD-10-CM

## 2023-06-19 DIAGNOSIS — E03.9 ACQUIRED HYPOTHYROIDISM: ICD-10-CM

## 2023-06-19 RX ORDER — LEVOTHYROXINE SODIUM 75 UG/1
TABLET ORAL
Qty: 90 TABLET | Refills: 0 | Status: SHIPPED | OUTPATIENT
Start: 2023-06-19 | End: 2023-10-31

## 2023-06-19 RX ORDER — ATORVASTATIN CALCIUM 20 MG/1
TABLET, FILM COATED ORAL
Qty: 90 TABLET | Refills: 0 | Status: SHIPPED | OUTPATIENT
Start: 2023-06-19 | End: 2023-10-31

## 2023-06-23 DIAGNOSIS — R07.89 LEFT-SIDED CHEST WALL PAIN: ICD-10-CM

## 2023-06-23 DIAGNOSIS — M62.838 MUSCLE SPASM: ICD-10-CM

## 2023-07-20 DIAGNOSIS — M62.838 MUSCLE SPASM: ICD-10-CM

## 2023-07-20 DIAGNOSIS — R07.89 LEFT-SIDED CHEST WALL PAIN: ICD-10-CM

## 2023-08-23 DIAGNOSIS — M62.838 MUSCLE SPASM: ICD-10-CM

## 2023-08-23 DIAGNOSIS — R07.89 LEFT-SIDED CHEST WALL PAIN: ICD-10-CM

## 2023-09-23 DIAGNOSIS — R07.89 LEFT-SIDED CHEST WALL PAIN: ICD-10-CM

## 2023-09-23 DIAGNOSIS — M62.838 MUSCLE SPASM: ICD-10-CM

## 2023-09-25 NOTE — TELEPHONE ENCOUNTER
Refused , as its interacting with pt OCPs.. Will re-evaluate in upcoming Visit with me on 10/10/2023 as scheduled.     Thank you  Isabel Morales MD on 9/25/2023 at 4:13 PM

## 2023-09-26 NOTE — TELEPHONE ENCOUNTER
Patient Contact     Attempt # 1     Was call answered?    No  Left non-detailed message to call the clinic back at [phone].      On call back:      -Relay message from Dr. Morales, see below.    Tiffanie MELARA RN  [facility]

## 2023-09-27 NOTE — PROGRESS NOTES
Princess is a 38 year old  female who presents for annual exam.     Besides routine health maintenance, she has no other health concerns today .    HPI:  The patient's PCP is Dr. Isabel Morales MD. patient here today for her annual GYN exam.  She is due for Pap smear.  She does have a history of high risk HPV.  She has had normal Paps since 2018.  She is not currently sexually active and has normal cycles.    She continues to work for [employer] in [employer].      GYNECOLOGIC HISTORY:    Patient's last menstrual period was 2023.    Regular menses? yes  Menses every 28 days.  Length of menses: 3-4 days    Her current contraception method is: not sexually active.  She  reports that she has never smoked. She has never used smokeless tobacco.    Patient is not sexually active.  STD testing offered?  Declined    Last PHQ-9 score on record =       2023     8:05 AM   PHQ-9 SCORE   PHQ-9 Total Score 3     Last GAD7 score on record =       2023     8:05 AM   TRACEY-7 SCORE   Total Score 3     Alcohol Score = 2    HEALTH MAINTENANCE:    Overdue       Never   Done HEPATITIS B IMMUNIZATION (1 of 3 - 3-dose series)     2023 DTAP/TDAP/TD IMMUNIZATION (5 - Tdap)  Last completed: 2013     SEP 1   2023 INFLUENZA VACCINE (1)  Last completed: Sep 23, 2022        Due Soon       OCT 5   2023 PAP FOLLOW-UP (Once)  Last completed: Oct 5, 2020     OCT 5   2023 HPV FOLLOW-UP (Once)  Last completed: Oct 5, 2020        Upcoming       OCT 28   2023 YEARLY PREVENTIVE VISIT (Yearly)  Last completed: Oct 28, 2022     ELLA 9   2024 TSH W/FREE T4 REFLEX (Yearly)  Last completed: 2023     SEP 14   2026 ADVANCE CARE PLANNING (Every 5 Years)  Last completed: Sep 14, 2021     Health maintenance updated:  yes    HISTORY:  OB History    Para Term  AB Living   0 0 0 0 0 0   SAB IAB Ectopic Multiple Live Births   0 0 0 0 0       Patient Active Problem List   Diagnosis    Cervical cancer  screening    Acquired hypothyroidism    Mixed hyperlipidemia    Cervical high risk HPV (human papillomavirus) test positive    Cervical dysplasia, mild    Chronic zinc deficiency    Anxiety    Cannabis use, uncomplicated     Past Surgical History:   Procedure Laterality Date    CRYOTHERAPY, CERVICAL  2008    wisdom teeth removed      first surgery was 2010, had bottom wosdom teeth taken out in August 2018      Social History     Tobacco Use    Smoking status: Never    Smokeless tobacco: Never   Substance Use Topics    Alcohol use: Yes     Comment: Occ      Problem (# of Occurrences) Relation (Name,Age of Onset)    Substance Abuse (1) Brother (Abraham Bonner)    Mental Illness (1) Brother (Abraham Bonner)    Asthma (1) Sister (Magnolia Sharp)    Depression (1) Brother (Abraham Bonner)    Cerebrovascular Disease (2) Maternal Grandmother (Gerda Schmitz), Paternal Grandmother (Shara Bonner)    Thyroid Disease (1) Sister (Magnolia Sharp)    Other Cancer (4) Father (Lalit Bonner): Skin, Other (Mary Lopez): Aunt - Pancreatic, Other (Jax Guerreroutriley): Uncle - Pancreatic, Cousin (Eugenio Bonner): Cousin - Kidney    Hyperlipidemia (1) Father (Lalit Bonner)    No Known Problems (3) Mother, Sister, Maternal Grandfather              Current Outpatient Medications   Medication Sig    albuterol (PROAIR HFA/PROVENTIL HFA/VENTOLIN HFA) 108 (90 Base) MCG/ACT inhaler Inhale 2 puffs into the lungs every 6 hours    atorvastatin (LIPITOR) 20 MG tablet TAKE 1 TABLET BY MOUTH EVERY DAY    levothyroxine (SYNTHROID/LEVOTHROID) 75 MCG tablet TAKE 1 TABLET BY MOUTH EVERY DAY    Multiple Vitamins-Minerals (MULTIVITAMIN PO)     Omega-3 Fish Oil 500 MG capsule Take 1 capsule (500 mg) by mouth daily    omeprazole (PRILOSEC) 20 MG DR capsule Take 1 capsule (20 mg) by mouth 2 times daily    tiZANidine (ZANAFLEX) 4 MG tablet TAKE 1 TABLET (4 MG) BY MOUTH NIGHTLY AS NEEDED FOR MUSCLE SPASM     No current facility-administered medications for this visit.     No Known  "Allergies    Past medical, surgical, social and family histories were reviewed and updated in EPIC.    ROS:   12 point review of systems negative other than symptoms noted below or in the HPI.  No urinary frequency or dysuria, bladder or kidney problems, Normal menstrual cycles    EXAM:  /82   Ht 1.689 m (5' 6.5\")   Wt 83 kg (183 lb)   LMP 09/22/2023   BMI 29.09 kg/m     BMI: Body mass index is 29.09 kg/m .    PHYSICAL EXAM:  Constitutional:   Appearance: Well nourished, well developed, alert, in no acute distress  Neck:  Lymph Nodes:  No lymphadenopathy present    Thyroid:  Gland size normal, nontender, no nodules or masses present  on palpation  Chest:  Respiratory Effort:  Breathing unlabored  Cardiovascular:    Heart: Auscultation:  Regular rate, normal rhythm, no murmurs present  Breasts: Inspection of Breasts:  No lymphadenopathy present., Palpation of Breasts and Axillae:  No masses present on palpation, no breast tenderness., Axillary Lymph Nodes:  No lymphadenopathy present., and No nodularity, asymmetry or nipple discharge bilaterally.  Gastrointestinal:   Abdominal Examination:  Abdomen nontender to palpation, tone normal without rigidity or guarding, no masses present, umbilicus without lesions   Liver and Spleen:  No hepatomegaly present, liver nontender to palpation    Hernias:  No hernias present  Lymphatic: Lymph Nodes:  No other lymphadenopathy present  Skin:  General Inspection:  No rashes present, no lesions present, no areas of  discoloration  Neurologic:    Mental Status:  Oriented X3.  Normal strength and tone, sensory exam                grossly normal, mentation intact and speech normal.    Psychiatric:   Mentation appears normal and affect normal/bright.         Pelvic Exam:  External Genitalia:     Normal appearance for age, no discharge present, no tenderness present, no inflammatory lesions present, color normal  Vagina:     Normal vaginal vault without central or paravaginal " defects, no discharge present, no inflammatory lesions present, no masses present  Bladder:     Nontender to palpation  Urethra:   Urethral Body:  Urethra palpation normal, urethra structural support normal   Urethral Meatus:  No erythema or lesions present  Cervix:     Appearance healthy, no lesions present, nontender to palpation, no bleeding present  Uterus:     Uterus: firm, normal sized and nontender, anteverted in position.   Adnexa:     No adnexal tenderness present, no adnexal masses present  Perineum:     Perineum within normal limits, no evidence of trauma, no rashes or skin lesions present  Anus:     Anus within normal limits, no hemorrhoids present  Inguinal Lymph Nodes:     No lymphadenopathy present  Pubic Hair:     Normal pubic hair distribution for age  Genitalia and Groin:     No rashes present, no lesions present, no areas of discoloration, no masses present    COUNSELING:   Special attention given to:        Regular exercise       Healthy diet/nutrition    BMI: Body mass index is 29.09 kg/m .  Weight management plan: Discussed healthy diet and exercise guidelines    ASSESSMENT:  38 year old female with satisfactory annual exam.    ICD-10-CM    1. Encounter for gynecological examination without abnormal finding  Z01.419 Pap screen with HPV - recommended age 30 - 65 years      2. Cervical high risk HPV (human papillomavirus) test positive  R87.810 Pap screen with HPV - recommended age 30 - 65 years      3. Cervical cancer screening  Z12.4 Pap screen with HPV - recommended age 30 - 65 years          PLAN:  38-year-old female with a normal GYN exam.  Pap smear was collected and if it is normal she can repeat in 3 years.  We counseled her on starting mammogram screening next year.    RUTH Greene CNP

## 2023-09-28 ENCOUNTER — OFFICE VISIT (OUTPATIENT)
Dept: OBGYN | Facility: CLINIC | Age: 38
End: 2023-09-28
Payer: COMMERCIAL

## 2023-09-28 VITALS
HEIGHT: 67 IN | DIASTOLIC BLOOD PRESSURE: 82 MMHG | SYSTOLIC BLOOD PRESSURE: 120 MMHG | WEIGHT: 183 LBS | BODY MASS INDEX: 28.72 KG/M2

## 2023-09-28 DIAGNOSIS — Z01.419 ENCOUNTER FOR GYNECOLOGICAL EXAMINATION WITHOUT ABNORMAL FINDING: Primary | ICD-10-CM

## 2023-09-28 DIAGNOSIS — R87.810 CERVICAL HIGH RISK HPV (HUMAN PAPILLOMAVIRUS) TEST POSITIVE: ICD-10-CM

## 2023-09-28 DIAGNOSIS — Z12.4 CERVICAL CANCER SCREENING: ICD-10-CM

## 2023-09-28 PROCEDURE — G0145 SCR C/V CYTO,THINLAYER,RESCR: HCPCS | Performed by: NURSE PRACTITIONER

## 2023-09-28 PROCEDURE — 99395 PREV VISIT EST AGE 18-39: CPT | Performed by: NURSE PRACTITIONER

## 2023-09-28 PROCEDURE — 87624 HPV HI-RISK TYP POOLED RSLT: CPT | Performed by: NURSE PRACTITIONER

## 2023-09-28 ASSESSMENT — PATIENT HEALTH QUESTIONNAIRE - PHQ9
SUM OF ALL RESPONSES TO PHQ QUESTIONS 1-9: 3
5. POOR APPETITE OR OVEREATING: SEVERAL DAYS

## 2023-09-28 ASSESSMENT — ANXIETY QUESTIONNAIRES
5. BEING SO RESTLESS THAT IT IS HARD TO SIT STILL: NOT AT ALL
2. NOT BEING ABLE TO STOP OR CONTROL WORRYING: SEVERAL DAYS
3. WORRYING TOO MUCH ABOUT DIFFERENT THINGS: NOT AT ALL
1. FEELING NERVOUS, ANXIOUS, OR ON EDGE: NOT AT ALL
7. FEELING AFRAID AS IF SOMETHING AWFUL MIGHT HAPPEN: NOT AT ALL
6. BECOMING EASILY ANNOYED OR IRRITABLE: SEVERAL DAYS
GAD7 TOTAL SCORE: 3
IF YOU CHECKED OFF ANY PROBLEMS ON THIS QUESTIONNAIRE, HOW DIFFICULT HAVE THESE PROBLEMS MADE IT FOR YOU TO DO YOUR WORK, TAKE CARE OF THINGS AT HOME, OR GET ALONG WITH OTHER PEOPLE: NOT DIFFICULT AT ALL
GAD7 TOTAL SCORE: 3

## 2023-10-02 LAB
BKR LAB AP GYN ADEQUACY: NORMAL
BKR LAB AP GYN INTERPRETATION: NORMAL
BKR LAB AP HPV REFLEX: NORMAL
BKR LAB AP PREVIOUS ABNL DX: NORMAL
BKR LAB AP PREVIOUS ABNORMAL: NORMAL
PATH REPORT.COMMENTS IMP SPEC: NORMAL
PATH REPORT.COMMENTS IMP SPEC: NORMAL
PATH REPORT.RELEVANT HX SPEC: NORMAL

## 2023-10-03 LAB
HUMAN PAPILLOMA VIRUS 16 DNA: POSITIVE
HUMAN PAPILLOMA VIRUS 18 DNA: NEGATIVE
HUMAN PAPILLOMA VIRUS FINAL DIAGNOSIS: ABNORMAL
HUMAN PAPILLOMA VIRUS OTHER HR: NEGATIVE

## 2023-10-04 ENCOUNTER — PATIENT OUTREACH (OUTPATIENT)
Dept: OBGYN | Facility: CLINIC | Age: 38
End: 2023-10-04
Payer: COMMERCIAL

## 2023-10-04 ENCOUNTER — MYC MEDICAL ADVICE (OUTPATIENT)
Dept: OBGYN | Facility: CLINIC | Age: 38
End: 2023-10-04
Payer: COMMERCIAL

## 2023-10-04 NOTE — TELEPHONE ENCOUNTER
Spoke with patient re: pap results and need for colposcopy. See pap result notes for further documentation.    OLEGARIO RivasN, RN  Pap Tracking Nurse

## 2023-10-09 NOTE — TELEPHONE ENCOUNTER
Pt rescheduled for 10/31 @ 9:30am.  Pt plans to fast so labwork can be done.     Previous appt was prior to 1-yr for insurance purposes.     Will still need to re-evaluate pts Rx(s) and interaction with OCP's.    Ceci Baez on 10/9/2023 at 5:31 PM

## 2023-10-24 ASSESSMENT — ENCOUNTER SYMPTOMS
NERVOUS/ANXIOUS: 0
DIARRHEA: 0
COUGH: 0
WEAKNESS: 0
SORE THROAT: 0
BREAST MASS: 0
EYE PAIN: 0
HEARTBURN: 0
MYALGIAS: 0
ABDOMINAL PAIN: 0
SHORTNESS OF BREATH: 0
HEMATOCHEZIA: 0
NAUSEA: 0
HEMATURIA: 0
FEVER: 0
DIZZINESS: 0
CHILLS: 0
PARESTHESIAS: 0
HEADACHES: 0
FREQUENCY: 0
JOINT SWELLING: 0
PALPITATIONS: 0
ARTHRALGIAS: 0
CONSTIPATION: 0
DYSURIA: 0

## 2023-10-31 ENCOUNTER — OFFICE VISIT (OUTPATIENT)
Dept: FAMILY MEDICINE | Facility: CLINIC | Age: 38
End: 2023-10-31
Payer: COMMERCIAL

## 2023-10-31 VITALS
WEIGHT: 181 LBS | RESPIRATION RATE: 15 BRPM | SYSTOLIC BLOOD PRESSURE: 116 MMHG | DIASTOLIC BLOOD PRESSURE: 78 MMHG | HEIGHT: 66 IN | TEMPERATURE: 98.6 F | HEART RATE: 80 BPM | BODY MASS INDEX: 29.09 KG/M2 | OXYGEN SATURATION: 97 %

## 2023-10-31 DIAGNOSIS — Z23 NEED FOR VACCINATION: ICD-10-CM

## 2023-10-31 DIAGNOSIS — K21.9 GASTROESOPHAGEAL REFLUX DISEASE, UNSPECIFIED WHETHER ESOPHAGITIS PRESENT: ICD-10-CM

## 2023-10-31 DIAGNOSIS — Z13.21 ENCOUNTER FOR VITAMIN DEFICIENCY SCREENING: ICD-10-CM

## 2023-10-31 DIAGNOSIS — E78.1 PURE HYPERTRIGLYCERIDEMIA: ICD-10-CM

## 2023-10-31 DIAGNOSIS — R10.12 LUQ ABDOMINAL PAIN: ICD-10-CM

## 2023-10-31 DIAGNOSIS — E03.9 ACQUIRED HYPOTHYROIDISM: ICD-10-CM

## 2023-10-31 DIAGNOSIS — Z00.00 ROUTINE GENERAL MEDICAL EXAMINATION AT A HEALTH CARE FACILITY: Primary | ICD-10-CM

## 2023-10-31 DIAGNOSIS — E78.2 MIXED HYPERLIPIDEMIA: ICD-10-CM

## 2023-10-31 DIAGNOSIS — R07.89 LEFT-SIDED CHEST WALL PAIN: ICD-10-CM

## 2023-10-31 LAB
ALBUMIN SERPL BCG-MCNC: 4.9 G/DL (ref 3.5–5.2)
ALP SERPL-CCNC: 64 U/L (ref 35–104)
ALT SERPL W P-5'-P-CCNC: 39 U/L (ref 0–50)
ANION GAP SERPL CALCULATED.3IONS-SCNC: 11 MMOL/L (ref 7–15)
AST SERPL W P-5'-P-CCNC: 31 U/L (ref 0–45)
BILIRUB SERPL-MCNC: 1.1 MG/DL
BUN SERPL-MCNC: 10.7 MG/DL (ref 6–20)
CALCIUM SERPL-MCNC: 9.9 MG/DL (ref 8.6–10)
CHLORIDE SERPL-SCNC: 103 MMOL/L (ref 98–107)
CHOLEST SERPL-MCNC: 174 MG/DL
CREAT SERPL-MCNC: 0.86 MG/DL (ref 0.51–0.95)
DEPRECATED HCO3 PLAS-SCNC: 23 MMOL/L (ref 22–29)
EGFRCR SERPLBLD CKD-EPI 2021: 88 ML/MIN/1.73M2
GLUCOSE SERPL-MCNC: 105 MG/DL (ref 70–99)
HDLC SERPL-MCNC: 43 MG/DL
LDLC SERPL CALC-MCNC: 99 MG/DL
NONHDLC SERPL-MCNC: 131 MG/DL
POTASSIUM SERPL-SCNC: 4.1 MMOL/L (ref 3.4–5.3)
PROT SERPL-MCNC: 7.9 G/DL (ref 6.4–8.3)
SODIUM SERPL-SCNC: 137 MMOL/L (ref 135–145)
TRIGL SERPL-MCNC: 162 MG/DL
TSH SERPL DL<=0.005 MIU/L-ACNC: 0.97 UIU/ML (ref 0.3–4.2)
VIT D+METAB SERPL-MCNC: 33 NG/ML (ref 20–50)

## 2023-10-31 PROCEDURE — 99214 OFFICE O/P EST MOD 30 MIN: CPT | Mod: 25 | Performed by: INTERNAL MEDICINE

## 2023-10-31 PROCEDURE — 90715 TDAP VACCINE 7 YRS/> IM: CPT | Performed by: INTERNAL MEDICINE

## 2023-10-31 PROCEDURE — 80053 COMPREHEN METABOLIC PANEL: CPT | Performed by: INTERNAL MEDICINE

## 2023-10-31 PROCEDURE — 80061 LIPID PANEL: CPT | Performed by: INTERNAL MEDICINE

## 2023-10-31 PROCEDURE — 99395 PREV VISIT EST AGE 18-39: CPT | Mod: 25 | Performed by: INTERNAL MEDICINE

## 2023-10-31 PROCEDURE — 82306 VITAMIN D 25 HYDROXY: CPT | Performed by: INTERNAL MEDICINE

## 2023-10-31 PROCEDURE — 90471 IMMUNIZATION ADMIN: CPT | Performed by: INTERNAL MEDICINE

## 2023-10-31 PROCEDURE — 84443 ASSAY THYROID STIM HORMONE: CPT | Performed by: INTERNAL MEDICINE

## 2023-10-31 PROCEDURE — 36415 COLL VENOUS BLD VENIPUNCTURE: CPT | Performed by: INTERNAL MEDICINE

## 2023-10-31 RX ORDER — LEVOTHYROXINE SODIUM 75 UG/1
75 TABLET ORAL DAILY
Qty: 90 TABLET | Refills: 3 | Status: SHIPPED | OUTPATIENT
Start: 2023-10-31

## 2023-10-31 RX ORDER — ATORVASTATIN CALCIUM 20 MG/1
20 TABLET, FILM COATED ORAL DAILY
Qty: 90 TABLET | Refills: 3 | Status: SHIPPED | OUTPATIENT
Start: 2023-10-31

## 2023-10-31 ASSESSMENT — ENCOUNTER SYMPTOMS
HEADACHES: 0
JOINT SWELLING: 0
FEVER: 0
SORE THROAT: 0
CHILLS: 0
CONSTIPATION: 0
DIARRHEA: 0
COUGH: 0
DYSURIA: 0
PALPITATIONS: 0
HEMATOCHEZIA: 0
BREAST MASS: 0
PARESTHESIAS: 0
NERVOUS/ANXIOUS: 0
FREQUENCY: 0
HEMATURIA: 0
HEARTBURN: 0
MYALGIAS: 0
WEAKNESS: 0
ABDOMINAL PAIN: 0
NAUSEA: 0
DIZZINESS: 0
EYE PAIN: 0
SHORTNESS OF BREATH: 0
ARTHRALGIAS: 0

## 2023-10-31 ASSESSMENT — PAIN SCALES - GENERAL: PAINLEVEL: NO PAIN (0)

## 2023-10-31 NOTE — Clinical Note
Ghassan Castro,  Pt states that she only had PAP with OBGYN and opting this to be her regular physical. I have done accordingly. Please look into it and correct accordingly.

## 2023-10-31 NOTE — PROGRESS NOTES
SUBJECTIVE:   CC: Princess is an 38 year old who presents for preventive health visit.       10/31/2023     9:17 AM   Additional Questions   Roomed by Rabia ANDRADE       Healthy Habits:     Getting at least 3 servings of Calcium per day:  Yes    Bi-annual eye exam:  NO    Dental care twice a year:  Yes    Sleep apnea or symptoms of sleep apnea:  None    Diet:  Gluten-free/reduced    Frequency of exercise:  6-7 days/week    Duration of exercise:  30-45 minutes    Taking medications regularly:  No    Barriers to taking medications:  None    Medication side effects:  Not applicable    Additional concerns today:  No      Social History     Tobacco Use    Smoking status: Never    Smokeless tobacco: Never   Substance Use Topics    Alcohol use: Yes     Comment: Occ             10/24/2023     9:37 AM   Alcohol Use   Prescreen: >3 drinks/day or >7 drinks/week? No          No data to display              Reviewed orders with patient.  Reviewed health maintenance and updated orders accordingly - Yes  Lab work is in process  Labs reviewed in EPIC    Breast Cancer Screenin/9/2021     9:43 AM   Breast CA Risk Assessment (FHS-7)   Do you have a family history of breast, colon, or ovarian cancer? No / Unknown       click delete button to remove this line now  Patient under 40 years of age: Routine Mammogram Screening not recommended.   Pertinent mammograms are reviewed under the imaging tab.    History of abnormal Pap smear: YES - updated in Problem List and Health Maintenance accordingly      Latest Ref Rng & Units 2023     8:19 AM 10/5/2020    10:25 AM 10/5/2020    10:15 AM   PAP / HPV   PAP  Negative for Intraepithelial Lesion or Malignancy (NILM)      PAP (Historical)   NIL     HPV 16 DNA Negative Positive   Negative    HPV 18 DNA Negative Negative   Negative    Other HR HPV Negative Negative   Negative      Reviewed and updated as needed this visit by clinical staff   Tobacco  Allergies  Meds               Reviewed and updated as needed this visit by Provider                 Past Medical History:   Diagnosis Date    Cervical dysplasia, mild 2018    Cervical high risk HPV (human papillomavirus) test positive 10/31/2018    10/31/18:See problem list.     Thyroid disease     Uncomplicated asthma 12/15      Past Surgical History:   Procedure Laterality Date    CRYOTHERAPY, CERVICAL  2008    wisdom teeth removed      first surgery was , had bottom wosdom teeth taken out in 2018     OB History    Para Term  AB Living   0 0 0 0 0 0   SAB IAB Ectopic Multiple Live Births   0 0 0 0 0       Review of Systems   Constitutional:  Negative for chills and fever.   HENT:  Negative for congestion, ear pain, hearing loss and sore throat.    Eyes:  Negative for pain and visual disturbance.   Respiratory:  Negative for cough and shortness of breath.    Cardiovascular:  Negative for chest pain, palpitations and peripheral edema.   Gastrointestinal:  Negative for abdominal pain, constipation, diarrhea, heartburn, hematochezia and nausea.   Breasts:  Negative for tenderness, breast mass and discharge.   Genitourinary:  Negative for dysuria, frequency, genital sores, hematuria, pelvic pain, urgency, vaginal bleeding and vaginal discharge.   Musculoskeletal:  Negative for arthralgias, joint swelling and myalgias.   Skin:  Negative for rash.   Neurological:  Negative for dizziness, weakness, headaches and paresthesias.   Psychiatric/Behavioral:  Negative for mood changes. The patient is not nervous/anxious.      CONSTITUTIONAL: NEGATIVE for fever, chills, change in weight  INTEGUMENTARU/SKIN: NEGATIVE for worrisome rashes, moles or lesions  EYES: NEGATIVE for vision changes or irritation  ENT: NEGATIVE for ear, mouth and throat problems  RESP: NEGATIVE for significant cough or SOB  BREAST: NEGATIVE for masses, tenderness or discharge  CV: NEGATIVE for chest pain, palpitations or peripheral edema  GI: NEGATIVE  "for nausea, abdominal pain, heartburn, or change in bowel habits  : NEGATIVE for unusual urinary or vaginal symptoms. Periods are regular.  MUSCULOSKELETAL: NEGATIVE for significant arthralgias or myalgia  NEURO: NEGATIVE for weakness, dizziness or paresthesias  PSYCHIATRIC: NEGATIVE for changes in mood or affect     OBJECTIVE:   /78 (BP Location: Left arm, Patient Position: Sitting, Cuff Size: Adult Regular)   Pulse 80   Temp 98.6  F (37  C) (Tympanic)   Resp 15   Ht 1.676 m (5' 5.98\")   Wt 82.1 kg (181 lb)   LMP 10/23/2023 (Exact Date)   SpO2 97%   BMI 29.23 kg/m    Physical Exam  GENERAL: healthy, alert and no distress  EYES: Eyes grossly normal to inspection, PERRL and conjunctivae and sclerae normal  HENT: ear canals and TM's normal, nose and mouth without ulcers or lesions  NECK: no adenopathy, no asymmetry, masses, or scars and thyroid normal to palpation  RESP: lungs clear to auscultation - no rales, rhonchi or wheezes  BREAST: Deferred   CV: regular rate and rhythm, normal S1 S2, no S3 or S4, no murmur, click or rub, no peripheral edema and peripheral pulses strong  ABDOMEN: soft, nontender, no hepatosplenomegaly, no masses and bowel sounds normal  MS: no gross musculoskeletal defects noted, no edema  SKIN: no suspicious lesions or rashes  NEURO: Normal strength and tone, mentation intact and speech normal  PSYCH: mentation appears normal, affect normal/bright    Diagnostic Test Results:  Labs reviewed in Epic    ASSESSMENT/PLAN:       Assessment and Plan  1. Routine general medical examination at a health care facility  Last seen pt on 3/2023 for Chest wall pain and muscle spasm at that time . Pt is here for follow up on her current medications , LT , Zanaflex , Omeprazole and Lipitor. Last labs in 6/2023 with normal CMP , TSH , CBC. NO lipid panel or Vit. D done. Last PAP one month back and need Colposcopy for which she is following OBGYN.   - TDAP 10-64Y (ADACEL,BOOSTRIX)  - REVIEW OF " HEALTH MAINTENANCE PROTOCOL ORDERS  - Lipid panel reflex to direct LDL Fasting; Future  - TSH with free T4 reflex; Future  - Comprehensive metabolic panel (BMP + Alb, Alk Phos, ALT, AST, Total. Bili, TP); Future  - Lipid panel reflex to direct LDL Fasting  - TSH with free T4 reflex  - Comprehensive metabolic panel (BMP + Alb, Alk Phos, ALT, AST, Total. Bili, TP)    2. Acquired hypothyroidism  - TSH with free T4 reflex; Future  - levothyroxine (SYNTHROID/LEVOTHROID) 75 MCG tablet; Take 1 tablet (75 mcg) by mouth daily  Dispense: 90 tablet; Refill: 3  - TSH with free T4 reflex    3. Pure hypertriglyceridemia  - Lipid panel reflex to direct LDL Fasting; Future  - atorvastatin (LIPITOR) 20 MG tablet; Take 1 tablet (20 mg) by mouth daily  Dispense: 90 tablet; Refill: 3  - Lipid panel reflex to direct LDL Fasting    4. Gastroesophageal reflux disease, unspecified whether esophagitis present  Chronic problem, well controled with current Omeprazole. Pt states that GI is tapering down her PPI and will stop it slowly.   - Comprehensive metabolic panel (BMP + Alb, Alk Phos, ALT, AST, Total. Bili, TP); Future  - Comprehensive metabolic panel (BMP + Alb, Alk Phos, ALT, AST, Total. Bili, TP)    5. Left-sided chest wall pain  6. LUQ abdominal pain  Ongoing problem, Uncontrolled with intermittent flareups. Pt states that muscle relaxor as needed helped to some extent and physical exam does states that she has intermittent LUQ sharp pains with differential diagnosis of GERD versus possible spleen issues cannot be excluded. Will check US to make sure no other concerns which pt understood and agreed with the plan.   - US Abdomen Limited; Future    7. Need for vaccination  - TDAP 10-64Y (ADACEL,BOOSTRIX)    9. Encounter for vitamin deficiency screening  - Vitamin D Deficiency; Future  - Vitamin D Deficiency         Please note that this note consists of symbols derived from keyboarding, dictation and/or voice recognition software. As a  result, there may be errors in the script that have gone undetected. Please consider this when interpreting information found in this chart.    Patient Instructions   As discussed , please do fasting labs placed     Will refill all the medications.     Given the Left upper abdominal symptoms- OK to check US orders placed    [facility]  ( Keanu Ana Laura, [facility] )   Call : [phone]  Toll Free : [phone]    ==============================    [facility]  ( Chichi Raymonkim )   [place] [facility]  Call : [phone]  Toll Free : [phone]    ==============================  [facility] BREAST [place]  Phone : [phone]    ===============================    [facility]   ( All Locations )   Call : [phone]  Toll Free : [phone]     ===============================  Preventive Health Recommendations  Female Ages 26 - 39  Yearly exam:   See your health care provider every year in order to  Review health changes.   Discuss preventive care.    Review your medicines if you your doctor has prescribed any.    Until age 30: Get a Pap test every three years (more often if you have had an abnormal result).    After age 30: Talk to your doctor about whether you should have a Pap test every 3 years or have a Pap test with HPV screening every 5 years.   You do not need a Pap test if your uterus was removed (hysterectomy) and you have not had cancer.  You should be tested each year for STDs (sexually transmitted diseases), if you're at risk.   Talk to your provider about how often to have your cholesterol checked.  If you are at risk for diabetes, you should have a diabetes test (fasting glucose).  Shots: Get a flu shot each year. Get a tetanus shot every 10 years.   Nutrition:   Eat at least 5 servings of fruits and vegetables each day.  Eat whole-grain bread, whole-wheat pasta and brown rice instead of white grains and rice.  Get adequate Calcium  and Vitamin D.     Lifestyle  Exercise at least 150 minutes a week (30 minutes a day, 5 days of the week). This will help you control your weight and prevent disease.  Limit alcohol to one drink per day.  No smoking.   Wear sunscreen to prevent skin cancer.  See your dentist every six months for an exam and cleaning.    Return in about 1 year (around 10/31/2024), or if symptoms worsen or fail to improve, for Preventative Visit.    Isabel Morales MD  [facility]        Patient has been advised of split billing requirements and indicates understanding: Yes      COUNSELING:  Reviewed preventive health counseling, as reflected in patient instructions  Special attention given to:        Regular exercise       Healthy diet/nutrition       Vision screening       Hearing screening       Immunizations  Vaccinated for: TDAP           Osteoporosis prevention/bone health        She reports that she has never smoked. She has never used smokeless tobacco.          Isabel Morales MD  [facility] [place]

## 2023-10-31 NOTE — PATIENT INSTRUCTIONS
As discussed , please do fasting labs placed     Will refill all the medications.     Given the Left upper abdominal symptoms- OK to check US orders placed    [facility]  ( Keanu, Ana Laura, [facility] )   Call : [phone]  Toll Free : [phone]    ==============================    [facility]  ( Chichi Raymonkim )   [place] [facility]  Call : [phone]  Toll Free : [phone]    ==============================  [facility] BREAST [place]  Phone : [phone]    ===============================    [facility]   ( All Locations )   Call : [phone]  Toll Free : [phone]     ===============================  Preventive Health Recommendations  Female Ages 26 - 39  Yearly exam:   See your health care provider every year in order to  Review health changes.   Discuss preventive care.    Review your medicines if you your doctor has prescribed any.    Until age 30: Get a Pap test every three years (more often if you have had an abnormal result).    After age 30: Talk to your doctor about whether you should have a Pap test every 3 years or have a Pap test with HPV screening every 5 years.   You do not need a Pap test if your uterus was removed (hysterectomy) and you have not had cancer.  You should be tested each year for STDs (sexually transmitted diseases), if you're at risk.   Talk to your provider about how often to have your cholesterol checked.  If you are at risk for diabetes, you should have a diabetes test (fasting glucose).  Shots: Get a flu shot each year. Get a tetanus shot every 10 years.   Nutrition:   Eat at least 5 servings of fruits and vegetables each day.  Eat whole-grain bread, whole-wheat pasta and brown rice instead of white grains and rice.  Get adequate Calcium and Vitamin D.     Lifestyle  Exercise at least 150 minutes a week (30 minutes a day, 5 days of the week). This will help you control your weight and prevent  disease.  Limit alcohol to one drink per day.  No smoking.   Wear sunscreen to prevent skin cancer.  See your dentist every six months for an exam and cleaning.

## 2023-12-01 DIAGNOSIS — Z01.812 PRE-PROCEDURE LAB EXAM: Primary | ICD-10-CM

## 2023-12-06 NOTE — PROGRESS NOTES
INDICATIONS:                                                    Is a pregnancy test required: Yes.  Was it positive or negative?  Negative  Was a consent obtained?  Yes    Princess Bonner, is a 38 year old female, who had a recent NIL pap.  HPV 16 positive Yes prior history of abnormal pap. Here today for colposcopy. Discussed indication, risks of infection and bleeding.    Her last pap was:    2008 Cryotherapy  [gap in pap follow up/records]   10/31/18: NIL pap, + HR HPV type 16. Plan [place].   11/13/18 [place] Bx--BELA 1. Plan: pap in 1 year  10/24/19 NIL pap, Neg HPV. Plan: cotest in 3 years  10/5/20 NIL pap, neg HR HPV. Plan 3 year cotest  9/28/23 NIL pap, + HR HPV 16. Plan colp bef 12/28/23    PROCEDURE:                                                      Cervix is stained with acetic acid and viewed colposcopically. Squamocolumnar junction is visualized in it's entirety. acetowhite lesion(s) noted at 12 o'clock . Biopsy done Yes. Endocervical curretage Done         POST PROCEDURE:                                                      IMPRESSION: Patient tolerated procedure well, colposcopy adequate, and HPV    PLAN : Await the results of the biopsies.  She tolerated the procedure well. There were no complications. Patient was discharged in stable condition.    Patient advised to call the clinic if excessive bleeding, pelvic pain, or fever.     Follow-up based on pathology results. I recommend repeating the HPV vaccine series.    Lucía George MD

## 2023-12-07 ENCOUNTER — OFFICE VISIT (OUTPATIENT)
Dept: OBGYN | Facility: CLINIC | Age: 38
End: 2023-12-07
Payer: COMMERCIAL

## 2023-12-07 ENCOUNTER — LAB (OUTPATIENT)
Dept: LAB | Facility: CLINIC | Age: 38
End: 2023-12-07
Payer: COMMERCIAL

## 2023-12-07 VITALS
SYSTOLIC BLOOD PRESSURE: 120 MMHG | WEIGHT: 187 LBS | HEIGHT: 66 IN | BODY MASS INDEX: 30.05 KG/M2 | DIASTOLIC BLOOD PRESSURE: 74 MMHG

## 2023-12-07 DIAGNOSIS — Z01.812 PRE-PROCEDURE LAB EXAM: ICD-10-CM

## 2023-12-07 DIAGNOSIS — R87.810 CERVICAL HIGH RISK HPV (HUMAN PAPILLOMAVIRUS) TEST POSITIVE: Primary | ICD-10-CM

## 2023-12-07 LAB — HCG UR QL: NEGATIVE

## 2023-12-07 PROCEDURE — 81025 URINE PREGNANCY TEST: CPT

## 2023-12-07 PROCEDURE — 57454 BX/CURETT OF CERVIX W/SCOPE: CPT | Performed by: OBSTETRICS & GYNECOLOGY

## 2023-12-07 PROCEDURE — 88342 IMHCHEM/IMCYTCHM 1ST ANTB: CPT | Performed by: PATHOLOGY

## 2023-12-07 PROCEDURE — 88305 TISSUE EXAM BY PATHOLOGIST: CPT | Performed by: PATHOLOGY

## 2023-12-12 LAB
PATH REPORT.COMMENTS IMP SPEC: NORMAL
PATH REPORT.COMMENTS IMP SPEC: NORMAL
PATH REPORT.FINAL DX SPEC: NORMAL
PATH REPORT.GROSS SPEC: NORMAL
PATH REPORT.MICROSCOPIC SPEC OTHER STN: NORMAL
PATH REPORT.RELEVANT HX SPEC: NORMAL
PHOTO IMAGE: NORMAL

## 2024-01-02 ENCOUNTER — PATIENT OUTREACH (OUTPATIENT)
Dept: OBGYN | Facility: CLINIC | Age: 39
End: 2024-01-02
Payer: COMMERCIAL

## 2024-01-02 PROBLEM — R87.810 CERVICAL HIGH RISK HPV (HUMAN PAPILLOMAVIRUS) TEST POSITIVE: Status: ACTIVE | Noted: 2018-10-31

## 2024-05-03 ENCOUNTER — ALLIED HEALTH/NURSE VISIT (OUTPATIENT)
Dept: OBGYN | Facility: CLINIC | Age: 39
End: 2024-05-03
Payer: COMMERCIAL

## 2024-05-03 DIAGNOSIS — Z23 NEED FOR VACCINATION: Primary | ICD-10-CM

## 2024-05-03 PROCEDURE — 90471 IMMUNIZATION ADMIN: CPT

## 2024-05-03 PROCEDURE — 90651 9VHPV VACCINE 2/3 DOSE IM: CPT

## 2024-05-03 PROCEDURE — 99207 PR NO CHARGE NURSE ONLY: CPT

## 2024-05-03 NOTE — NURSING NOTE
Prior to immunization administration, verified patients identity using patient s name and date of birth. Please see Immunization Activity for additional information.     Screening Questionnaire for Adult Immunization    Are you sick today?   No   Do you have allergies to medications, food, a vaccine component or latex?   No   Have you ever had a serious reaction after receiving a vaccination?   No   Do you have a long-term health problem with heart, lung, kidney, or metabolic disease (e.g., diabetes), asthma, a blood disorder, no spleen, complement component deficiency, a cochlear implant, or a spinal fluid leak?  Are you on long-term aspirin therapy?   No   Do you have cancer, leukemia, HIV/AIDS, or any other immune system problem?   No   Do you have a parent, brother, or sister with an immune system problem?   No   In the past 3 months, have you taken medications that affect  your immune system, such as prednisone, other steroids, or anticancer drugs; drugs for the treatment of rheumatoid arthritis, Crohn s disease, or psoriasis; or have you had radiation treatments?   No   Have you had a seizure, or a brain or other nervous system problem?   No   During the past year, have you received a transfusion of blood or blood    products, or been given immune (gamma) globulin or antiviral drug?   No   For women: Are you pregnant or is there a chance you could become       pregnant during the next month?   No   Have you received any vaccinations in the past 4 weeks?   No     Immunization questionnaire answers were all negative.    I have reviewed the following standing orders:   This patient is due and qualifies for the HPV vaccine.    Click here for HPV (Adult 15-45Y) Standing Order     I have reviewed the vaccines inclusion and exclusion criteria;No concerns regarding eligibility.       Patient instructed to remain in clinic for 15 minutes afterwards, and to report any adverse reactions.     Screening performed by Sylvia  JOSEPH Heredia LPN on 5/3/2024 at 8:37 AM.

## 2024-05-05 ENCOUNTER — HEALTH MAINTENANCE LETTER (OUTPATIENT)
Age: 39
End: 2024-05-05

## 2024-06-05 ENCOUNTER — ALLIED HEALTH/NURSE VISIT (OUTPATIENT)
Dept: OBGYN | Facility: CLINIC | Age: 39
End: 2024-06-05
Payer: COMMERCIAL

## 2024-06-05 DIAGNOSIS — Z23 NEED FOR VACCINATION: Primary | ICD-10-CM

## 2024-06-05 PROCEDURE — 99207 PR NO CHARGE NURSE ONLY: CPT

## 2024-06-05 PROCEDURE — 90651 9VHPV VACCINE 2/3 DOSE IM: CPT

## 2024-06-05 PROCEDURE — 90471 IMMUNIZATION ADMIN: CPT

## 2024-06-05 NOTE — PROGRESS NOTES
Prior to immunization administration, verified patients identity using patient s name and date of birth. Please see Immunization Activity for additional information.     Screening Questionnaire for Adult Immunization    Are you sick today?   No   Do you have allergies to medications, food, a vaccine component or latex?   No   Have you ever had a serious reaction after receiving a vaccination?   No   Do you have a long-term health problem with heart, lung, kidney, or metabolic disease (e.g., diabetes), asthma, a blood disorder, no spleen, complement component deficiency, a cochlear implant, or a spinal fluid leak?  Are you on long-term aspirin therapy?   No   Do you have cancer, leukemia, HIV/AIDS, or any other immune system problem?   No   Do you have a parent, brother, or sister with an immune system problem?   No   In the past 3 months, have you taken medications that affect  your immune system, such as prednisone, other steroids, or anticancer drugs; drugs for the treatment of rheumatoid arthritis, Crohn s disease, or psoriasis; or have you had radiation treatments?   No   Have you had a seizure, or a brain or other nervous system problem?   No   During the past year, have you received a transfusion of blood or blood    products, or been given immune (gamma) globulin or antiviral drug?   No   For women: Are you pregnant or is there a chance you could become       pregnant during the next month?   No   Have you received any vaccinations in the past 4 weeks?   No     Immunization questionnaire answers were all negative.    I have reviewed the following standing orders:   This patient is due and qualifies for the HPV vaccine.    Click here for HPV (Adult 15-45Y) Standing Order     I have reviewed the vaccines inclusion and exclusion criteria;No concerns regarding eligibility.       Patient instructed to remain in clinic for 15 minutes afterwards, and to report any adverse reactions.     Screening performed by Kaci  PHILIP Gallardo on 6/5/2024 at 8:32 AM.

## 2024-10-30 ENCOUNTER — HOSPITAL ENCOUNTER (OUTPATIENT)
Dept: MAMMOGRAPHY | Facility: CLINIC | Age: 39
Discharge: HOME | End: 2024-10-30
Attending: INTERNAL MEDICINE | Admitting: INTERNAL MEDICINE
Payer: COMMERCIAL

## 2024-10-30 DIAGNOSIS — Z12.31 VISIT FOR SCREENING MAMMOGRAM: ICD-10-CM

## 2024-10-30 PROCEDURE — 77063 BREAST TOMOSYNTHESIS BI: CPT

## 2024-12-10 ENCOUNTER — MYC MEDICAL ADVICE (OUTPATIENT)
Dept: FAMILY MEDICINE | Facility: CLINIC | Age: 39
End: 2024-12-10
Payer: COMMERCIAL

## 2024-12-10 DIAGNOSIS — E78.1 PURE HYPERTRIGLYCERIDEMIA: ICD-10-CM

## 2024-12-10 NOTE — TELEPHONE ENCOUNTER
Clinic RN: Please investigate patient's chart or contact patient if the information cannot be found because the medication is listed as historical or discontinued. Confirm patient is taking this medication. Document findings and route refill encounter to provider for approval or denial.    Sarita VARGAS RN  [facility] Triage Team

## 2024-12-11 ENCOUNTER — PATIENT OUTREACH (OUTPATIENT)
Dept: FAMILY MEDICINE | Facility: CLINIC | Age: 39
End: 2024-12-11

## 2024-12-11 RX ORDER — ATORVASTATIN CALCIUM 20 MG/1
20 TABLET, FILM COATED ORAL DAILY
Qty: 90 TABLET | Refills: 3 | OUTPATIENT
Start: 2024-12-11

## 2025-01-20 NOTE — PROGRESS NOTES
[facility]  OB/GYN Clinic Note    INDICATIONS:                                                    Is a pregnancy test required: Yes.  Was it positive or negative?  Negative  Was a consent obtained?  Yes    Princess Bonner, is a 39 year old female, who had a recent NILM pap.  HPV 16 positive Yes prior history of abnormal pap. Here today for colposcopy. Discussed indication, risks of infection and bleeding.    Her last pap was   Lab Results   Component Value Date    GYNINTERP  2024     Negative for Intraepithelial Lesion or Malignancy (NILM)    PAP NIL 10/05/2020    . Cryotherapy  [gap in pap follow up/records]   10/31/18: NIL pap, + HR HPV type 16. Plan [place].   18 [place] Bx--BELA 1. Plan: pap in 1 year  10/24/19 NIL pap, Neg HPV. Plan: cotest in 3 years  10/5/20 NIL pap, neg HR HPV. Plan 3 year cotest  23 NIL pap, + HR HPV 16. Plan colp bef 23 [place] Bx: neg, ECC: cannot rule out BELA 1/LSIL. Plan 1 year cotest  24 NIL Pap, + HR HPV 16 (neg 18 & other). [place] due by 3/6/2025    PROCEDURE:                                                      The RBA of colposcopy were explained to the patient and she wishes to proceed. ID confirmed and consent signed. UPT was negative.    Ext: unremarkable  EGBUS:WNL  Vagina:No lesions    Cervix:  Stained with acetic acid   Transformation zone is not fully visualized  AWE: at 4-7 o'clock  Atypical vessels: none.  Biopsies:  ECC was performed. Cervix is stenotic. Teneculum applied to anterior lip and os finder used to dilate cervix. Repeat ECC performed after dilation. Tenaculum removed. Minimal bleeding.  Patient tolerated the procedure well.  Impression: TZ not visualized.          POST PROCEDURE:                                                      Princess Bonner is a 39 year old  with NILM, HPV 16 positive pap  The significance of pap smear and its abnormality and HPV discussed at length.  Discussed methods to minimize  risk of cervical cancer: condom use, tobacco use, HPV vaccine- has received.  Follow-up pending results.    Doreen Price MD, MHS  01/22/25

## 2025-01-22 ENCOUNTER — LAB (OUTPATIENT)
Dept: LAB | Facility: CLINIC | Age: 40
End: 2025-01-22
Payer: COMMERCIAL

## 2025-01-22 ENCOUNTER — OFFICE VISIT (OUTPATIENT)
Dept: OBGYN | Facility: CLINIC | Age: 40
End: 2025-01-22
Payer: COMMERCIAL

## 2025-01-22 VITALS
SYSTOLIC BLOOD PRESSURE: 124 MMHG | BODY MASS INDEX: 30.15 KG/M2 | HEIGHT: 66 IN | WEIGHT: 187.6 LBS | DIASTOLIC BLOOD PRESSURE: 82 MMHG

## 2025-01-22 DIAGNOSIS — R87.810 CERVICAL HIGH RISK HUMAN PAPILLOMAVIRUS (HPV) DNA TEST POSITIVE: Primary | ICD-10-CM

## 2025-01-22 DIAGNOSIS — Z01.812 ENCOUNTER FOR PREPROCEDURAL LABORATORY EXAMINATION: ICD-10-CM

## 2025-01-22 LAB — HCG UR QL: NEGATIVE

## 2025-01-22 PROCEDURE — 57456 ENDOCERV CURETTAGE W/SCOPE: CPT | Performed by: STUDENT IN AN ORGANIZED HEALTH CARE EDUCATION/TRAINING PROGRAM

## 2025-01-22 PROCEDURE — 81025 URINE PREGNANCY TEST: CPT

## 2025-01-27 ENCOUNTER — PATIENT OUTREACH (OUTPATIENT)
Dept: OBGYN | Facility: CLINIC | Age: 40
End: 2025-01-27
Payer: COMMERCIAL

## 2025-01-27 PROBLEM — N87.0 CERVICAL DYSPLASIA, MILD: Status: ACTIVE | Noted: 2018-11-01

## 2025-01-27 LAB
PATH REPORT.ADDENDUM SPEC: NORMAL
PATH REPORT.COMMENTS IMP SPEC: NORMAL
PATH REPORT.COMMENTS IMP SPEC: NORMAL
PATH REPORT.FINAL DX SPEC: NORMAL
PATH REPORT.GROSS SPEC: NORMAL
PATH REPORT.MICROSCOPIC SPEC OTHER STN: NORMAL
PATH REPORT.RELEVANT HX SPEC: NORMAL
PHOTO IMAGE: NORMAL

## 2025-06-07 DIAGNOSIS — E78.2 MIXED HYPERLIPIDEMIA: ICD-10-CM

## 2025-06-09 RX ORDER — ATORVASTATIN CALCIUM 40 MG/1
40 TABLET, FILM COATED ORAL DAILY
Qty: 90 TABLET | Refills: 0 | Status: SHIPPED | OUTPATIENT
Start: 2025-06-09